# Patient Record
Sex: FEMALE | Race: WHITE | NOT HISPANIC OR LATINO | Employment: UNEMPLOYED | URBAN - METROPOLITAN AREA
[De-identification: names, ages, dates, MRNs, and addresses within clinical notes are randomized per-mention and may not be internally consistent; named-entity substitution may affect disease eponyms.]

---

## 2021-04-26 DIAGNOSIS — N13.9 OBSTRUCTIVE UROPATHY: Primary | ICD-10-CM

## 2021-04-27 ENCOUNTER — OFFICE VISIT (OUTPATIENT)
Dept: UROLOGY | Facility: CLINIC | Age: 36
End: 2021-04-27
Payer: MEDICAID

## 2021-04-27 VITALS
DIASTOLIC BLOOD PRESSURE: 96 MMHG | WEIGHT: 293 LBS | SYSTOLIC BLOOD PRESSURE: 176 MMHG | HEIGHT: 67 IN | BODY MASS INDEX: 45.99 KG/M2

## 2021-04-27 DIAGNOSIS — Q62.39 UPJ OBSTRUCTION, CONGENITAL: Primary | ICD-10-CM

## 2021-04-27 PROCEDURE — 99204 OFFICE O/P NEW MOD 45 MIN: CPT | Mod: S$GLB,,, | Performed by: UROLOGY

## 2021-04-27 PROCEDURE — 99204 PR OFFICE/OUTPT VISIT, NEW, LEVL IV, 45-59 MIN: ICD-10-PCS | Mod: S$GLB,,, | Performed by: UROLOGY

## 2021-04-27 RX ORDER — MELOXICAM 15 MG/1
TABLET ORAL
COMMUNITY
Start: 2021-04-16

## 2021-05-24 ENCOUNTER — TELEPHONE (OUTPATIENT)
Dept: UROLOGY | Facility: CLINIC | Age: 36
End: 2021-05-24

## 2021-08-03 ENCOUNTER — OUTSIDE PLACE OF SERVICE (OUTPATIENT)
Dept: UROLOGY | Facility: CLINIC | Age: 36
End: 2021-08-03
Payer: MEDICAID

## 2021-08-03 PROCEDURE — 99232 PR SUBSEQUENT HOSPITAL CARE,LEVL II: ICD-10-PCS | Mod: ,,, | Performed by: UROLOGY

## 2021-08-03 PROCEDURE — 99232 SBSQ HOSP IP/OBS MODERATE 35: CPT | Mod: ,,, | Performed by: UROLOGY

## 2021-08-04 ENCOUNTER — OUTSIDE PLACE OF SERVICE (OUTPATIENT)
Dept: UROLOGY | Facility: CLINIC | Age: 36
End: 2021-08-04
Payer: MEDICAID

## 2021-08-04 PROCEDURE — 99232 PR SUBSEQUENT HOSPITAL CARE,LEVL II: ICD-10-PCS | Mod: ,,, | Performed by: UROLOGY

## 2021-08-04 PROCEDURE — 99232 SBSQ HOSP IP/OBS MODERATE 35: CPT | Mod: ,,, | Performed by: UROLOGY

## 2021-08-06 ENCOUNTER — OUTSIDE PLACE OF SERVICE (OUTPATIENT)
Dept: UROLOGY | Facility: CLINIC | Age: 36
End: 2021-08-06
Payer: MEDICAID

## 2021-08-06 PROCEDURE — 52332 PR CYSTOSCOPY,INSERT URETERAL STENT: ICD-10-PCS | Mod: LT,,, | Performed by: UROLOGY

## 2021-08-06 PROCEDURE — 52332 CYSTOSCOPY AND TREATMENT: CPT | Mod: LT,,, | Performed by: UROLOGY

## 2021-08-06 PROCEDURE — 74420 UROGRAPHY RTRGR +-KUB: CPT | Mod: 26,,, | Performed by: UROLOGY

## 2021-08-06 PROCEDURE — 74420 PR  X-RAY RETROGRADE PYELOGRAM: ICD-10-PCS | Mod: 26,,, | Performed by: UROLOGY

## 2021-09-20 ENCOUNTER — TELEPHONE (OUTPATIENT)
Dept: UROLOGY | Facility: CLINIC | Age: 36
End: 2021-09-20

## 2021-09-21 ENCOUNTER — TELEPHONE (OUTPATIENT)
Dept: UROLOGY | Facility: CLINIC | Age: 36
End: 2021-09-21

## 2021-09-22 DIAGNOSIS — Q62.39 UPJ OBSTRUCTION, CONGENITAL: Primary | ICD-10-CM

## 2021-10-04 ENCOUNTER — TELEPHONE (OUTPATIENT)
Dept: UROLOGY | Facility: CLINIC | Age: 36
End: 2021-10-04

## 2021-10-05 ENCOUNTER — TELEPHONE (OUTPATIENT)
Dept: UROLOGY | Facility: CLINIC | Age: 36
End: 2021-10-05

## 2021-12-08 ENCOUNTER — OUTSIDE PLACE OF SERVICE (OUTPATIENT)
Dept: UROLOGY | Facility: CLINIC | Age: 36
End: 2021-12-08
Payer: MEDICAID

## 2021-12-08 LAB
CALCIUM BLD-MCNC: POSITIVE MG/DL
COVID-19 AB, IGM: NEGATIVE

## 2021-12-08 PROCEDURE — 99223 PR INITIAL HOSPITAL CARE,LEVL III: ICD-10-PCS | Mod: ,,, | Performed by: UROLOGY

## 2021-12-08 PROCEDURE — 99223 1ST HOSP IP/OBS HIGH 75: CPT | Mod: ,,, | Performed by: UROLOGY

## 2021-12-10 ENCOUNTER — OUTSIDE PLACE OF SERVICE (OUTPATIENT)
Dept: UROLOGY | Facility: CLINIC | Age: 36
End: 2021-12-10
Payer: MEDICAID

## 2021-12-10 PROCEDURE — 74420 PR  X-RAY RETROGRADE PYELOGRAM: ICD-10-PCS | Mod: 26,,, | Performed by: UROLOGY

## 2021-12-10 PROCEDURE — 52332 PR CYSTOSCOPY,INSERT URETERAL STENT: ICD-10-PCS | Mod: LT,,, | Performed by: UROLOGY

## 2021-12-10 PROCEDURE — 52332 CYSTOSCOPY AND TREATMENT: CPT | Mod: LT,,, | Performed by: UROLOGY

## 2021-12-10 PROCEDURE — 74420 UROGRAPHY RTRGR +-KUB: CPT | Mod: 26,,, | Performed by: UROLOGY

## 2021-12-11 LAB
ANION GAP SERPL CALC-SCNC: 6 MMOL/L (ref 3–11)
BASOPHILS NFR BLD: 0.3 % (ref 0–3)
BUN SERPL-MCNC: 12 MG/DL (ref 7–18)
BUN/CREAT SERPL: 8.63 RATIO (ref 7–18)
CALCIUM SERPL-MCNC: 8.2 MG/DL (ref 8.8–10.5)
CHLORIDE SERPL-SCNC: 104 MMOL/L (ref 100–108)
CO2 SERPL-SCNC: 29 MMOL/L (ref 21–32)
CREAT SERPL-MCNC: 1.39 MG/DL (ref 0.55–1.02)
EOSINOPHIL NFR BLD: 0.7 % (ref 1–3)
ERYTHROCYTE [DISTWIDTH] IN BLOOD BY AUTOMATED COUNT: 13.7 % (ref 12.5–18)
GFR ESTIMATION: 43
GLUCOSE SERPL-MCNC: 216 MG/DL (ref 70–110)
HCT VFR BLD AUTO: 30 % (ref 37–47)
HGB BLD-MCNC: 9.6 G/DL (ref 12–16)
LYMPHOCYTES NFR BLD: 16 % (ref 25–40)
MCH RBC QN AUTO: 27.7 PG (ref 27–31.2)
MCHC RBC AUTO-ENTMCNC: 32 G/DL (ref 31.8–35.4)
MCV RBC AUTO: 86.5 FL (ref 80–97)
MONOCYTES NFR BLD: 6 % (ref 1–15)
NEUTROPHILS # BLD AUTO: 4.49 10*3/UL (ref 1.8–7.7)
NEUTROPHILS NFR BLD: 76.5 % (ref 37–80)
NUCLEATED RED BLOOD CELLS: 0 %
PLATELETS: 173 10*3/UL (ref 142–424)
POTASSIUM SERPL-SCNC: 4.1 MMOL/L (ref 3.6–5.2)
RBC # BLD AUTO: 3.47 10*6/UL (ref 4.2–5.4)
SODIUM BLD-SCNC: 139 MMOL/L (ref 135–145)
WBC # BLD: 5.9 10*3/UL (ref 4.6–10.2)

## 2021-12-12 LAB
ANION GAP SERPL CALC-SCNC: 11 MMOL/L (ref 3–11)
BASOPHILS NFR BLD: 0.5 % (ref 0–3)
BUN SERPL-MCNC: 19 MG/DL (ref 7–18)
BUN/CREAT SERPL: 20.43 RATIO (ref 7–18)
CALCIUM SERPL-MCNC: 8.2 MG/DL (ref 8.8–10.5)
CHLORIDE SERPL-SCNC: 103 MMOL/L (ref 100–108)
CO2 SERPL-SCNC: 24 MMOL/L (ref 21–32)
CREAT SERPL-MCNC: 0.93 MG/DL (ref 0.55–1.02)
EOSINOPHIL NFR BLD: 3.6 % (ref 1–3)
ERYTHROCYTE [DISTWIDTH] IN BLOOD BY AUTOMATED COUNT: 13.6 % (ref 12.5–18)
GFR ESTIMATION: > 60
GLUCOSE SERPL-MCNC: 105 MG/DL (ref 70–110)
HCT VFR BLD AUTO: 31.4 % (ref 37–47)
HGB BLD-MCNC: 10.2 G/DL (ref 12–16)
LYMPHOCYTES NFR BLD: 24.5 % (ref 25–40)
MCH RBC QN AUTO: 27.9 PG (ref 27–31.2)
MCHC RBC AUTO-ENTMCNC: 32.5 G/DL (ref 31.8–35.4)
MCV RBC AUTO: 86 FL (ref 80–97)
MONOCYTES NFR BLD: 5.9 % (ref 1–15)
NEUTROPHILS # BLD AUTO: 4.83 10*3/UL (ref 1.8–7.7)
NEUTROPHILS NFR BLD: 64.4 % (ref 37–80)
NUCLEATED RED BLOOD CELLS: 0 %
PLATELETS: 198 10*3/UL (ref 142–424)
POTASSIUM SERPL-SCNC: 4.2 MMOL/L (ref 3.6–5.2)
RBC # BLD AUTO: 3.65 10*6/UL (ref 4.2–5.4)
SODIUM BLD-SCNC: 138 MMOL/L (ref 135–145)
WBC # BLD: 7.5 10*3/UL (ref 4.6–10.2)

## 2022-01-06 ENCOUNTER — TELEPHONE (OUTPATIENT)
Dept: UROLOGY | Facility: CLINIC | Age: 37
End: 2022-01-06

## 2022-01-06 NOTE — TELEPHONE ENCOUNTER
----- Message from Rosi Carmichael sent at 1/6/2022  9:34 AM CST -----  Type:  Same Day Appointment Request    Caller is requesting a same day appointment.  Caller declined first available appointment listed below.    Name of Caller:Pema Norris  When is the first available appointment?02/21  Symptoms: Pt will not be able to make it to her 9:40 appointment and would like to speak with the nurse about coming in at a later time today. She said she really needs to get her stent changed and can not wait until the next available appointment.   Best Call Back Number:[phone] (home)   Additional Information: na

## 2022-06-30 ENCOUNTER — HOSPITAL ENCOUNTER (INPATIENT)
Facility: OTHER | Age: 37
LOS: 3 days | Discharge: HOME | DRG: 699 | End: 2022-07-04
Attending: EMERGENCY MEDICINE | Admitting: HOSPITALIST

## 2022-06-30 DIAGNOSIS — N13.30 HYDRONEPHROSIS: ICD-10-CM

## 2022-06-30 DIAGNOSIS — T83.84XA PAIN DUE TO URETERAL STENT, INITIAL ENCOUNTER: ICD-10-CM

## 2022-06-30 DIAGNOSIS — Q62.11 HYDRONEPHROSIS WITH URETEROPELVIC JUNCTION (UPJ) OBSTRUCTION: ICD-10-CM

## 2022-06-30 DIAGNOSIS — N10 ACUTE PYELONEPHRITIS: ICD-10-CM

## 2022-06-30 DIAGNOSIS — R10.9 ACUTE LEFT FLANK PAIN: ICD-10-CM

## 2022-06-30 DIAGNOSIS — Q62.39 UPJ OBSTRUCTION, CONGENITAL: Primary | ICD-10-CM

## 2022-06-30 DIAGNOSIS — E66.01 MORBID OBESITY WITH BMI OF 45.0-49.9, ADULT: ICD-10-CM

## 2022-06-30 DIAGNOSIS — N20.1 CALCULUS OF URETER: ICD-10-CM

## 2022-06-30 LAB
ALBUMIN SERPL BCP-MCNC: 3 G/DL (ref 3.5–5.2)
ALP SERPL-CCNC: 106 U/L (ref 55–135)
ALT SERPL W/O P-5'-P-CCNC: 25 U/L (ref 10–44)
ANION GAP SERPL CALC-SCNC: 11 MMOL/L (ref 8–16)
AST SERPL-CCNC: 20 U/L (ref 10–40)
B-HCG UR QL: NEGATIVE
BACTERIA #/AREA URNS HPF: ABNORMAL /HPF
BASOPHILS # BLD AUTO: 0.04 K/UL (ref 0–0.2)
BASOPHILS NFR BLD: 0.6 % (ref 0–1.9)
BILIRUB SERPL-MCNC: 0.9 MG/DL (ref 0.1–1)
BILIRUB UR QL STRIP: NEGATIVE
BUN SERPL-MCNC: 14 MG/DL (ref 6–20)
CALCIUM SERPL-MCNC: 8.8 MG/DL (ref 8.7–10.5)
CHLORIDE SERPL-SCNC: 101 MMOL/L (ref 95–110)
CLARITY UR: CLEAR
CO2 SERPL-SCNC: 25 MMOL/L (ref 23–29)
COLOR UR: YELLOW
CREAT SERPL-MCNC: 0.8 MG/DL (ref 0.5–1.4)
CTP QC/QA: YES
CTP QC/QA: YES
DIFFERENTIAL METHOD: ABNORMAL
EOSINOPHIL # BLD AUTO: 0.2 K/UL (ref 0–0.5)
EOSINOPHIL NFR BLD: 2.7 % (ref 0–8)
ERYTHROCYTE [DISTWIDTH] IN BLOOD BY AUTOMATED COUNT: 14.6 % (ref 11.5–14.5)
EST. GFR  (AFRICAN AMERICAN): >60 ML/MIN/1.73 M^2
EST. GFR  (NON AFRICAN AMERICAN): >60 ML/MIN/1.73 M^2
GLUCOSE SERPL-MCNC: 98 MG/DL (ref 70–110)
GLUCOSE UR QL STRIP: NEGATIVE
HCT VFR BLD AUTO: 31.6 % (ref 37–48.5)
HCV AB SERPL QL IA: NEGATIVE
HGB BLD-MCNC: 10.4 G/DL (ref 12–16)
HGB UR QL STRIP: ABNORMAL
HIV 1+2 AB+HIV1 P24 AG SERPL QL IA: NEGATIVE
IMM GRANULOCYTES # BLD AUTO: 0.04 K/UL (ref 0–0.04)
IMM GRANULOCYTES NFR BLD AUTO: 0.6 % (ref 0–0.5)
KETONES UR QL STRIP: NEGATIVE
LEUKOCYTE ESTERASE UR QL STRIP: ABNORMAL
LYMPHOCYTES # BLD AUTO: 1.7 K/UL (ref 1–4.8)
LYMPHOCYTES NFR BLD: 24.9 % (ref 18–48)
MCH RBC QN AUTO: 27.6 PG (ref 27–31)
MCHC RBC AUTO-ENTMCNC: 32.9 G/DL (ref 32–36)
MCV RBC AUTO: 84 FL (ref 82–98)
MICROSCOPIC COMMENT: ABNORMAL
MONOCYTES # BLD AUTO: 0.5 K/UL (ref 0.3–1)
MONOCYTES NFR BLD: 6.8 % (ref 4–15)
NEUTROPHILS # BLD AUTO: 4.3 K/UL (ref 1.8–7.7)
NEUTROPHILS NFR BLD: 64.4 % (ref 38–73)
NITRITE UR QL STRIP: POSITIVE
NRBC BLD-RTO: 0 /100 WBC
PH UR STRIP: 6 [PH] (ref 5–8)
PLATELET # BLD AUTO: 166 K/UL (ref 150–450)
PMV BLD AUTO: 9.6 FL (ref 9.2–12.9)
POTASSIUM SERPL-SCNC: 3.6 MMOL/L (ref 3.5–5.1)
PROT SERPL-MCNC: 6.8 G/DL (ref 6–8.4)
PROT UR QL STRIP: ABNORMAL
RBC # BLD AUTO: 3.77 M/UL (ref 4–5.4)
RBC #/AREA URNS HPF: 75 /HPF (ref 0–4)
SARS-COV-2 RDRP RESP QL NAA+PROBE: NEGATIVE
SODIUM SERPL-SCNC: 137 MMOL/L (ref 136–145)
SP GR UR STRIP: 1.01 (ref 1–1.03)
URN SPEC COLLECT METH UR: ABNORMAL
UROBILINOGEN UR STRIP-ACNC: NEGATIVE EU/DL
WBC # BLD AUTO: 6.74 K/UL (ref 3.9–12.7)
WBC #/AREA URNS HPF: 50 /HPF (ref 0–5)
WBC CLUMPS URNS QL MICRO: ABNORMAL

## 2022-06-30 PROCEDURE — 96361 HYDRATE IV INFUSION ADD-ON: CPT

## 2022-06-30 PROCEDURE — 87086 URINE CULTURE/COLONY COUNT: CPT | Performed by: EMERGENCY MEDICINE

## 2022-06-30 PROCEDURE — U0002 COVID-19 LAB TEST NON-CDC: HCPCS | Performed by: EMERGENCY MEDICINE

## 2022-06-30 PROCEDURE — 99220 PR INITIAL OBSERVATION CARE,LEVL III: CPT | Mod: ,,, | Performed by: NURSE PRACTITIONER

## 2022-06-30 PROCEDURE — 80053 COMPREHEN METABOLIC PANEL: CPT | Performed by: EMERGENCY MEDICINE

## 2022-06-30 PROCEDURE — 99285 EMERGENCY DEPT VISIT HI MDM: CPT | Mod: 25

## 2022-06-30 PROCEDURE — 99220 PR INITIAL OBSERVATION CARE,LEVL III: ICD-10-PCS | Mod: ,,, | Performed by: NURSE PRACTITIONER

## 2022-06-30 PROCEDURE — 86803 HEPATITIS C AB TEST: CPT | Performed by: EMERGENCY MEDICINE

## 2022-06-30 PROCEDURE — 96365 THER/PROPH/DIAG IV INF INIT: CPT

## 2022-06-30 PROCEDURE — G0378 HOSPITAL OBSERVATION PER HR: HCPCS

## 2022-06-30 PROCEDURE — 25000003 PHARM REV CODE 250: Performed by: NURSE PRACTITIONER

## 2022-06-30 PROCEDURE — 81000 URINALYSIS NONAUTO W/SCOPE: CPT | Performed by: EMERGENCY MEDICINE

## 2022-06-30 PROCEDURE — 85025 COMPLETE CBC W/AUTO DIFF WBC: CPT | Performed by: EMERGENCY MEDICINE

## 2022-06-30 PROCEDURE — U0003 INFECTIOUS AGENT DETECTION BY NUCLEIC ACID (DNA OR RNA); SEVERE ACUTE RESPIRATORY SYNDROME CORONAVIRUS 2 (SARS-COV-2) (CORONAVIRUS DISEASE [COVID-19]), AMPLIFIED PROBE TECHNIQUE, MAKING USE OF HIGH THROUGHPUT TECHNOLOGIES AS DESCRIBED BY CMS-2020-01-R: HCPCS | Performed by: EMERGENCY MEDICINE

## 2022-06-30 PROCEDURE — U0005 INFEC AGEN DETEC AMPLI PROBE: HCPCS | Performed by: EMERGENCY MEDICINE

## 2022-06-30 PROCEDURE — 63600175 PHARM REV CODE 636 W HCPCS: Performed by: EMERGENCY MEDICINE

## 2022-06-30 PROCEDURE — 96375 TX/PRO/DX INJ NEW DRUG ADDON: CPT

## 2022-06-30 PROCEDURE — 87389 HIV-1 AG W/HIV-1&-2 AB AG IA: CPT | Performed by: EMERGENCY MEDICINE

## 2022-06-30 PROCEDURE — 81025 URINE PREGNANCY TEST: CPT | Performed by: EMERGENCY MEDICINE

## 2022-06-30 PROCEDURE — 63600175 PHARM REV CODE 636 W HCPCS: Performed by: NURSE PRACTITIONER

## 2022-06-30 RX ORDER — MORPHINE SULFATE 4 MG/ML
4 INJECTION, SOLUTION INTRAMUSCULAR; INTRAVENOUS EVERY 4 HOURS PRN
Status: DISCONTINUED | OUTPATIENT
Start: 2022-06-30 | End: 2022-06-30

## 2022-06-30 RX ORDER — ONDANSETRON 2 MG/ML
4 INJECTION INTRAMUSCULAR; INTRAVENOUS EVERY 8 HOURS PRN
Status: DISCONTINUED | OUTPATIENT
Start: 2022-06-30 | End: 2022-07-04 | Stop reason: HOSPADM

## 2022-06-30 RX ORDER — HYDROMORPHONE HYDROCHLORIDE 1 MG/ML
0.5 INJECTION, SOLUTION INTRAMUSCULAR; INTRAVENOUS; SUBCUTANEOUS EVERY 6 HOURS PRN
Status: DISCONTINUED | OUTPATIENT
Start: 2022-07-01 | End: 2022-07-02

## 2022-06-30 RX ORDER — NALOXONE HCL 0.4 MG/ML
0.02 VIAL (ML) INJECTION
Status: DISCONTINUED | OUTPATIENT
Start: 2022-06-30 | End: 2022-07-04 | Stop reason: HOSPADM

## 2022-06-30 RX ORDER — ONDANSETRON 2 MG/ML
4 INJECTION INTRAMUSCULAR; INTRAVENOUS
Status: COMPLETED | OUTPATIENT
Start: 2022-06-30 | End: 2022-06-30

## 2022-06-30 RX ORDER — SODIUM CHLORIDE 9 MG/ML
INJECTION, SOLUTION INTRAVENOUS CONTINUOUS
Status: DISCONTINUED | OUTPATIENT
Start: 2022-06-30 | End: 2022-07-02

## 2022-06-30 RX ORDER — ACETAMINOPHEN 325 MG/1
650 TABLET ORAL EVERY 4 HOURS PRN
Status: DISCONTINUED | OUTPATIENT
Start: 2022-06-30 | End: 2022-07-04 | Stop reason: HOSPADM

## 2022-06-30 RX ORDER — SODIUM CHLORIDE 0.9 % (FLUSH) 0.9 %
10 SYRINGE (ML) INJECTION
Status: DISCONTINUED | OUTPATIENT
Start: 2022-06-30 | End: 2022-07-04 | Stop reason: HOSPADM

## 2022-06-30 RX ORDER — SODIUM CHLORIDE 0.9 % (FLUSH) 0.9 %
10 SYRINGE (ML) INJECTION EVERY 8 HOURS PRN
Status: DISCONTINUED | OUTPATIENT
Start: 2022-06-30 | End: 2022-07-04 | Stop reason: HOSPADM

## 2022-06-30 RX ADMIN — MORPHINE SULFATE 4 MG: 4 INJECTION, SOLUTION INTRAMUSCULAR; INTRAVENOUS at 09:06

## 2022-06-30 RX ADMIN — ONDANSETRON 4 MG: 2 INJECTION INTRAMUSCULAR; INTRAVENOUS at 05:06

## 2022-06-30 RX ADMIN — SODIUM CHLORIDE: 0.9 INJECTION, SOLUTION INTRAVENOUS at 09:06

## 2022-06-30 RX ADMIN — CEFTRIAXONE 1 G: 1 INJECTION, SOLUTION INTRAVENOUS at 07:06

## 2022-06-30 NOTE — ED PROVIDER NOTES
"Dictation #1  MRN:[id number]  CSN:[id number]  Encounter Date: 6/30/2022    SCRIBE #1 NOTE: I, Melanie Keith, am scribing for, and in the presence of, Wiliam Ruiz MD.       History     Chief Complaint   Patient presents with    flank pain     Pt c.o left flank pain onset yesterday. Pt was at [facility] and was being transferred here but opted to not come by ems due to "nobody could ride with me"  pt states she was being transferred here for surgery on urethra/kidney.  AAO x 3 nadn skin w.d      Time seen by provider: 4:50 PM    This is a 36 y.o. female who presents with complaint of left flank pain onset last night. The patient reports going to [facility] yesterday for her pain, which she describes as severe and "worse than having babies".  Her  notes she had a CAT scan of her abdomen and was told she needed surgery to reconnect her ureter to her kidney. She describes being told there was no urologist available at that facility to perform the procedure. The patient was then intended to transfer here via EMS, but opted not to as her  was unable to ride with her. She reports having a fever of 101.3°F last night, which has since resolved. She describes currently feeling nauseas and light-headed. Of note, the patient has had two ureteral stents placed in her left kidney. She states her most recent stent was placed in February. The patient's urologist is Dr. Osorio. This is the extent of the patient's complaints at this time.    The history is provided by the patient and the spouse.     Review of patient's allergies indicates:  No Known Allergies  Past Medical History:   Diagnosis Date    Disorder of kidney and ureter     Urinary tract infection      No past surgical history on file.  No family history on file.  Social History     Tobacco Use    Smoking status: Never Smoker    Smokeless tobacco: Never Used   Substance Use Topics    Alcohol use: Not Currently     Review of " Systems   Constitutional: Positive for fever (resolved). Negative for chills.   HENT: Negative for rhinorrhea, sore throat and trouble swallowing.    Respiratory: Negative for chest tightness, shortness of breath and wheezing.    Cardiovascular: Negative for chest pain, palpitations and leg swelling.   Gastrointestinal: Positive for nausea. Negative for abdominal pain, diarrhea and vomiting.   Genitourinary: Positive for flank pain (left). Negative for dysuria and vaginal bleeding.   Neurological: Positive for light-headedness. Negative for speech difficulty.   All other systems reviewed and are negative.      Physical Exam     Initial Vitals [06/30/22 1551]   BP Pulse Resp Temp SpO2   (!) 156/62 95 18 98.4 °F (36.9 °C) 99 %      MAP       --         Physical Exam    Nursing note and vitals reviewed.  Constitutional: She appears well-developed and well-nourished. She is not diaphoretic. No distress.   HENT:   Head: Normocephalic and atraumatic.   Right Ear: External ear normal.   Left Ear: External ear normal.   Eyes: Conjunctivae and EOM are normal. Pupils are equal, round, and reactive to light.   Neck: Neck supple. No tracheal deviation present.   Normal range of motion.  Cardiovascular: Normal rate, regular rhythm, normal heart sounds and intact distal pulses. Exam reveals no gallop and no friction rub.    No murmur heard.  Pulmonary/Chest: Breath sounds normal. No respiratory distress. She has no wheezes. She has no rhonchi. She has no rales. She exhibits no tenderness.   Abdominal: Abdomen is soft. Bowel sounds are normal. She exhibits no distension and no mass. There is no abdominal tenderness. There is no rebound and no guarding.   Musculoskeletal:         General: Normal range of motion.      Cervical back: Normal range of motion and neck supple.     Neurological: She is alert and oriented to person, place, and time.   Skin: Skin is warm and dry. Capillary refill takes less than 2 seconds.   Psychiatric:  She has a normal mood and affect. Thought content normal.         ED Course   Procedures  Labs Reviewed   CBC W/ AUTO DIFFERENTIAL - Abnormal; Notable for the following components:       Result Value    RBC 3.77 (*)     Hemoglobin 10.4 (*)     Hematocrit 31.6 (*)     RDW 14.6 (*)     Immature Granulocytes 0.6 (*)     All other components within normal limits   COMPREHENSIVE METABOLIC PANEL - Abnormal; Notable for the following components:    Albumin 3.0 (*)     All other components within normal limits   URINALYSIS, REFLEX TO URINE CULTURE - Abnormal; Notable for the following components:    Protein, UA Trace (*)     Occult Blood UA 3+ (*)     Nitrite, UA Positive (*)     Leukocytes, UA 3+ (*)     All other components within normal limits    Narrative:     Specimen Source->Urine   URINALYSIS MICROSCOPIC - Abnormal; Notable for the following components:    RBC, UA 75 (*)     WBC, UA 50 (*)     WBC Clumps, UA Occasional (*)     Bacteria Many (*)     All other components within normal limits    Narrative:     Specimen Source->Urine   CULTURE, URINE   HIV 1 / 2 ANTIBODY    Narrative:     Release to patient->Immediate   HEPATITIS C ANTIBODY    Narrative:     Release to patient->Immediate   POCT URINE PREGNANCY   SARS-COV-2 RDRP GENE          Imaging Results    None          Medications   cefTRIAXone (ROCEPHIN) 1 g/50 mL D5W IVPB (1 g Intravenous New Bag 6/30/22 1940)   ondansetron injection 4 mg (4 mg Intravenous Given 6/30/22 1740)     Medical Decision Making:   History:   Old Medical Records: I decided to obtain old medical records.  Old Records Summarized: other records.       <> Summary of Records: Reviewed and summarized the old medical record and it showed:   Note from the [facility] :  Patient is a 36 y.o. female who has a past medical history of Disorder of kidney and ureter and Urinary tract infection presented to [facility] with fever, weakness and fatigue. Patient found to have UTI on UA and  started on IV Rocephin. WBC normal and lactic acid normal. CT scan showed severe left hydronephrosis with edematous changes around left kidney. Patient has double J ureteral stents placed in the past. CT scan showed severe left hydronephrosis with edematous changes around left kidney. Urology consulted at facility and recommended transfer as patient is too complex for their facility. Urology connected to referring provider who has agreed to consult. Patient stable for transfer.   Differential Diagnosis:   Urinary tract infection, acute pyelonephritis, ureterolithiais, diverticulitis, colitis, inflammatory bowel disease, gastroenteritis, gastritis, ulcer, cholecystitis, gallstones, pancreatitis, ileus, small bowel obstruction, appendicitis, constipation      Clinical Tests:   Lab Tests: Ordered and Reviewed  ED Management:  35yo female with concerning findings of acute pyelonephritis, severe left hydronephrosis with indwelling stent, will admit to  and consult Urology. Patient was COVID positive last pm, but has no complaints of chest pain or SOB, her lung exam was normal and her sats are 99%, so will place on airbag precautions and evaluate.            Scribe Attestation:   Scribe #1: I performed the above scribed service and the documentation accurately describes the services I performed. I attest to the accuracy of the note.        ED Course as of 06/30/22 1955 Thu Jun 30, 2022 1904 Patient apparently tested positive for COVID last PM, will set proper precautions [MA]   1932 Case discussed with Dr. Rob, urology, recommends with continue with antibiotics and they will evaluate her in the morning. [MA]   1954 Case discussed with Hospital Medicine, will admit to Dr. Correa.  [MA]      ED Course User Index  [MA] Wiliam Ruiz MD           Physician Attestation for Scribe: I, MAA, reviewed documentation as scribed in my presence, which is both accurate and complete.  Clinical Impression:   Final  diagnoses:  [N10] Acute pyelonephritis          ED Disposition Condition    Observation               Wiliam Ruiz MD  06/30/22 1956

## 2022-06-30 NOTE — ED TRIAGE NOTES
Pt presents to ED c/o L flank pain onset yesterday. Was seen at [facility] and bakari with hydronephrosis. Says that she was sent here for surgery on urethra/kidney, but opted to drive herself because EMS said her  could not come with her.

## 2022-07-01 ENCOUNTER — ANESTHESIA EVENT (OUTPATIENT)
Dept: SURGERY | Facility: OTHER | Age: 37
DRG: 699 | End: 2022-07-01

## 2022-07-01 ENCOUNTER — ANESTHESIA (OUTPATIENT)
Dept: SURGERY | Facility: OTHER | Age: 37
DRG: 699 | End: 2022-07-01

## 2022-07-01 ENCOUNTER — TELEPHONE (OUTPATIENT)
Dept: UROLOGY | Facility: CLINIC | Age: 37
End: 2022-07-01

## 2022-07-01 PROBLEM — E66.01 MORBID OBESITY WITH BMI OF 45.0-49.9, ADULT: Status: ACTIVE | Noted: 2022-07-01

## 2022-07-01 LAB
ALBUMIN SERPL BCP-MCNC: 2.8 G/DL (ref 3.5–5.2)
ALP SERPL-CCNC: 101 U/L (ref 55–135)
ALT SERPL W/O P-5'-P-CCNC: 23 U/L (ref 10–44)
ANION GAP SERPL CALC-SCNC: 10 MMOL/L (ref 8–16)
AST SERPL-CCNC: 18 U/L (ref 10–40)
BASOPHILS # BLD AUTO: 0.03 K/UL (ref 0–0.2)
BASOPHILS NFR BLD: 0.6 % (ref 0–1.9)
BILIRUB SERPL-MCNC: 0.5 MG/DL (ref 0.1–1)
BUN SERPL-MCNC: 13 MG/DL (ref 6–20)
CALCIUM SERPL-MCNC: 8.7 MG/DL (ref 8.7–10.5)
CHLORIDE SERPL-SCNC: 105 MMOL/L (ref 95–110)
CO2 SERPL-SCNC: 26 MMOL/L (ref 23–29)
CREAT SERPL-MCNC: 0.8 MG/DL (ref 0.5–1.4)
DIFFERENTIAL METHOD: ABNORMAL
EOSINOPHIL # BLD AUTO: 0.2 K/UL (ref 0–0.5)
EOSINOPHIL NFR BLD: 2.9 % (ref 0–8)
ERYTHROCYTE [DISTWIDTH] IN BLOOD BY AUTOMATED COUNT: 14.7 % (ref 11.5–14.5)
EST. GFR  (AFRICAN AMERICAN): >60 ML/MIN/1.73 M^2
EST. GFR  (NON AFRICAN AMERICAN): >60 ML/MIN/1.73 M^2
GLUCOSE SERPL-MCNC: 117 MG/DL (ref 70–110)
HCT VFR BLD AUTO: 32.2 % (ref 37–48.5)
HGB BLD-MCNC: 10.4 G/DL (ref 12–16)
IMM GRANULOCYTES # BLD AUTO: 0.05 K/UL (ref 0–0.04)
IMM GRANULOCYTES NFR BLD AUTO: 1 % (ref 0–0.5)
LACTATE SERPL-SCNC: 0.8 MMOL/L (ref 0.5–2.2)
LYMPHOCYTES # BLD AUTO: 1.7 K/UL (ref 1–4.8)
LYMPHOCYTES NFR BLD: 32.3 % (ref 18–48)
MAGNESIUM SERPL-MCNC: 2 MG/DL (ref 1.6–2.6)
MCH RBC QN AUTO: 27.1 PG (ref 27–31)
MCHC RBC AUTO-ENTMCNC: 32.3 G/DL (ref 32–36)
MCV RBC AUTO: 84 FL (ref 82–98)
MONOCYTES # BLD AUTO: 0.3 K/UL (ref 0.3–1)
MONOCYTES NFR BLD: 6.5 % (ref 4–15)
NEUTROPHILS # BLD AUTO: 3 K/UL (ref 1.8–7.7)
NEUTROPHILS NFR BLD: 56.7 % (ref 38–73)
NRBC BLD-RTO: 0 /100 WBC
PHOSPHATE SERPL-MCNC: 3.1 MG/DL (ref 2.7–4.5)
PLATELET # BLD AUTO: 190 K/UL (ref 150–450)
PMV BLD AUTO: 9.9 FL (ref 9.2–12.9)
POTASSIUM SERPL-SCNC: 4 MMOL/L (ref 3.5–5.1)
PROT SERPL-MCNC: 6.6 G/DL (ref 6–8.4)
RBC # BLD AUTO: 3.84 M/UL (ref 4–5.4)
SARS-COV-2 RNA RESP QL NAA+PROBE: NOT DETECTED
SARS-COV-2- CYCLE NUMBER: NORMAL
SODIUM SERPL-SCNC: 141 MMOL/L (ref 136–145)
WBC # BLD AUTO: 5.2 K/UL (ref 3.9–12.7)

## 2022-07-01 PROCEDURE — 99233 PR SUBSEQUENT HOSPITAL CARE,LEVL III: ICD-10-PCS | Mod: ,,, | Performed by: HOSPITALIST

## 2022-07-01 PROCEDURE — 37000008 HC ANESTHESIA 1ST 15 MINUTES: Performed by: UROLOGY

## 2022-07-01 PROCEDURE — C1758 CATHETER, URETERAL: HCPCS | Performed by: UROLOGY

## 2022-07-01 PROCEDURE — 87086 URINE CULTURE/COLONY COUNT: CPT | Performed by: UROLOGY

## 2022-07-01 PROCEDURE — 71000033 HC RECOVERY, INTIAL HOUR: Performed by: UROLOGY

## 2022-07-01 PROCEDURE — 99223 1ST HOSP IP/OBS HIGH 75: CPT | Mod: 25,,, | Performed by: UROLOGY

## 2022-07-01 PROCEDURE — 37000009 HC ANESTHESIA EA ADD 15 MINS: Performed by: UROLOGY

## 2022-07-01 PROCEDURE — 25500020 PHARM REV CODE 255: Performed by: UROLOGY

## 2022-07-01 PROCEDURE — 25000003 PHARM REV CODE 250: Performed by: NURSE PRACTITIONER

## 2022-07-01 PROCEDURE — 25000003 PHARM REV CODE 250: Performed by: ANESTHESIOLOGY

## 2022-07-01 PROCEDURE — 63600175 PHARM REV CODE 636 W HCPCS: Performed by: NURSE PRACTITIONER

## 2022-07-01 PROCEDURE — 71000039 HC RECOVERY, EACH ADD'L HOUR: Performed by: UROLOGY

## 2022-07-01 PROCEDURE — 52317 REMOVE BLADDER STONE: CPT | Mod: ,,, | Performed by: UROLOGY

## 2022-07-01 PROCEDURE — 99233 SBSQ HOSP IP/OBS HIGH 50: CPT | Mod: ,,, | Performed by: HOSPITALIST

## 2022-07-01 PROCEDURE — 99223 PR INITIAL HOSPITAL CARE,LEVL III: ICD-10-PCS | Mod: 25,,, | Performed by: UROLOGY

## 2022-07-01 PROCEDURE — 25000003 PHARM REV CODE 250: Performed by: NURSE ANESTHETIST, CERTIFIED REGISTERED

## 2022-07-01 PROCEDURE — 27000221 HC OXYGEN, UP TO 24 HOURS

## 2022-07-01 PROCEDURE — 82365 CALCULUS SPECTROSCOPY: CPT | Performed by: UROLOGY

## 2022-07-01 PROCEDURE — 11000001 HC ACUTE MED/SURG PRIVATE ROOM

## 2022-07-01 PROCEDURE — 25000003 PHARM REV CODE 250

## 2022-07-01 PROCEDURE — 94761 N-INVAS EAR/PLS OXIMETRY MLT: CPT

## 2022-07-01 PROCEDURE — 83735 ASSAY OF MAGNESIUM: CPT | Performed by: NURSE PRACTITIONER

## 2022-07-01 PROCEDURE — 80053 COMPREHEN METABOLIC PANEL: CPT | Performed by: NURSE PRACTITIONER

## 2022-07-01 PROCEDURE — 63600175 PHARM REV CODE 636 W HCPCS: Performed by: NURSE ANESTHETIST, CERTIFIED REGISTERED

## 2022-07-01 PROCEDURE — 83605 ASSAY OF LACTIC ACID: CPT | Performed by: NURSE PRACTITIONER

## 2022-07-01 PROCEDURE — 36000706: Performed by: UROLOGY

## 2022-07-01 PROCEDURE — 36000707: Performed by: UROLOGY

## 2022-07-01 PROCEDURE — 96361 HYDRATE IV INFUSION ADD-ON: CPT

## 2022-07-01 PROCEDURE — 52317 PR REMOVE BLADDER STONE,<2.5 CM: ICD-10-PCS | Mod: ,,, | Performed by: UROLOGY

## 2022-07-01 PROCEDURE — 85025 COMPLETE CBC W/AUTO DIFF WBC: CPT | Performed by: NURSE PRACTITIONER

## 2022-07-01 PROCEDURE — 84100 ASSAY OF PHOSPHORUS: CPT | Performed by: NURSE PRACTITIONER

## 2022-07-01 PROCEDURE — 96375 TX/PRO/DX INJ NEW DRUG ADDON: CPT

## 2022-07-01 RX ORDER — PROPOFOL 10 MG/ML
VIAL (ML) INTRAVENOUS
Status: DISCONTINUED | OUTPATIENT
Start: 2022-07-01 | End: 2022-07-01

## 2022-07-01 RX ORDER — LIDOCAINE HCL/PF 100 MG/5ML
SYRINGE (ML) INTRAVENOUS
Status: DISCONTINUED | OUTPATIENT
Start: 2022-07-01 | End: 2022-07-01

## 2022-07-01 RX ORDER — FUROSEMIDE 10 MG/ML
INJECTION INTRAMUSCULAR; INTRAVENOUS
Status: DISCONTINUED | OUTPATIENT
Start: 2022-07-01 | End: 2022-07-01

## 2022-07-01 RX ORDER — KETOROLAC TROMETHAMINE 30 MG/ML
INJECTION, SOLUTION INTRAMUSCULAR; INTRAVENOUS
Status: DISCONTINUED | OUTPATIENT
Start: 2022-07-01 | End: 2022-07-01

## 2022-07-01 RX ORDER — PROCHLORPERAZINE EDISYLATE 5 MG/ML
5 INJECTION INTRAMUSCULAR; INTRAVENOUS EVERY 30 MIN PRN
Status: DISCONTINUED | OUTPATIENT
Start: 2022-07-01 | End: 2022-07-04 | Stop reason: HOSPADM

## 2022-07-01 RX ORDER — PROCHLORPERAZINE EDISYLATE 5 MG/ML
INJECTION INTRAMUSCULAR; INTRAVENOUS
Status: DISCONTINUED | OUTPATIENT
Start: 2022-07-01 | End: 2022-07-01

## 2022-07-01 RX ORDER — HYDROMORPHONE HYDROCHLORIDE 2 MG/ML
0.4 INJECTION, SOLUTION INTRAMUSCULAR; INTRAVENOUS; SUBCUTANEOUS EVERY 5 MIN PRN
Status: DISCONTINUED | OUTPATIENT
Start: 2022-07-01 | End: 2022-07-01

## 2022-07-01 RX ORDER — OXYCODONE HYDROCHLORIDE 5 MG/1
5 TABLET ORAL
Status: DISCONTINUED | OUTPATIENT
Start: 2022-07-01 | End: 2022-07-04 | Stop reason: HOSPADM

## 2022-07-01 RX ORDER — FENTANYL CITRATE 50 UG/ML
INJECTION, SOLUTION INTRAMUSCULAR; INTRAVENOUS
Status: DISCONTINUED | OUTPATIENT
Start: 2022-07-01 | End: 2022-07-01

## 2022-07-01 RX ORDER — SODIUM CHLORIDE 0.9 % (FLUSH) 0.9 %
3 SYRINGE (ML) INJECTION
Status: DISCONTINUED | OUTPATIENT
Start: 2022-07-01 | End: 2022-07-04 | Stop reason: HOSPADM

## 2022-07-01 RX ORDER — MEPERIDINE HYDROCHLORIDE 25 MG/ML
12.5 INJECTION INTRAMUSCULAR; INTRAVENOUS; SUBCUTANEOUS ONCE AS NEEDED
Status: DISCONTINUED | OUTPATIENT
Start: 2022-07-01 | End: 2022-07-01

## 2022-07-01 RX ORDER — MIDAZOLAM HYDROCHLORIDE 1 MG/ML
INJECTION INTRAMUSCULAR; INTRAVENOUS
Status: DISCONTINUED | OUTPATIENT
Start: 2022-07-01 | End: 2022-07-01

## 2022-07-01 RX ORDER — DIPHENHYDRAMINE HCL 25 MG
25 CAPSULE ORAL ONCE
Status: COMPLETED | OUTPATIENT
Start: 2022-07-01 | End: 2022-07-01

## 2022-07-01 RX ORDER — DEXAMETHASONE SODIUM PHOSPHATE 4 MG/ML
INJECTION, SOLUTION INTRA-ARTICULAR; INTRALESIONAL; INTRAMUSCULAR; INTRAVENOUS; SOFT TISSUE
Status: DISCONTINUED | OUTPATIENT
Start: 2022-07-01 | End: 2022-07-01

## 2022-07-01 RX ORDER — ONDANSETRON 2 MG/ML
INJECTION INTRAMUSCULAR; INTRAVENOUS
Status: DISCONTINUED | OUTPATIENT
Start: 2022-07-01 | End: 2022-07-01

## 2022-07-01 RX ORDER — ROCURONIUM BROMIDE 10 MG/ML
INJECTION, SOLUTION INTRAVENOUS
Status: DISCONTINUED | OUTPATIENT
Start: 2022-07-01 | End: 2022-07-01

## 2022-07-01 RX ADMIN — FUROSEMIDE 5 MG: 10 INJECTION, SOLUTION INTRAMUSCULAR; INTRAVENOUS at 12:07

## 2022-07-01 RX ADMIN — FENTANYL CITRATE 50 MCG: 50 INJECTION, SOLUTION INTRAMUSCULAR; INTRAVENOUS at 12:07

## 2022-07-01 RX ADMIN — OXYCODONE 5 MG: 5 TABLET ORAL at 11:07

## 2022-07-01 RX ADMIN — SODIUM CHLORIDE: 0.9 INJECTION, SOLUTION INTRAVENOUS at 11:07

## 2022-07-01 RX ADMIN — KETOROLAC TROMETHAMINE 30 MG: 30 INJECTION, SOLUTION INTRAMUSCULAR; INTRAVENOUS at 12:07

## 2022-07-01 RX ADMIN — CEFTRIAXONE 1 G: 1 INJECTION, SOLUTION INTRAVENOUS at 07:07

## 2022-07-01 RX ADMIN — SUGAMMADEX 300 MG: 100 INJECTION, SOLUTION INTRAVENOUS at 01:07

## 2022-07-01 RX ADMIN — PROPOFOL 170 MG: 10 INJECTION, EMULSION INTRAVENOUS at 12:07

## 2022-07-01 RX ADMIN — ONDANSETRON HYDROCHLORIDE 4 MG: 2 INJECTION INTRAMUSCULAR; INTRAVENOUS at 12:07

## 2022-07-01 RX ADMIN — ROCURONIUM BROMIDE 50 MG: 10 INJECTION, SOLUTION INTRAVENOUS at 12:07

## 2022-07-01 RX ADMIN — CEFTRIAXONE 1 G: 1 INJECTION, SOLUTION INTRAVENOUS at 12:07

## 2022-07-01 RX ADMIN — DEXAMETHASONE SODIUM PHOSPHATE 8 MG: 4 INJECTION, SOLUTION INTRAMUSCULAR; INTRAVENOUS at 12:07

## 2022-07-01 RX ADMIN — DIPHENHYDRAMINE HYDROCHLORIDE 25 MG: 25 CAPSULE ORAL at 12:07

## 2022-07-01 RX ADMIN — FENTANYL CITRATE 50 MCG: 50 INJECTION, SOLUTION INTRAMUSCULAR; INTRAVENOUS at 01:07

## 2022-07-01 RX ADMIN — SODIUM CHLORIDE, SODIUM LACTATE, POTASSIUM CHLORIDE, AND CALCIUM CHLORIDE: .6; .31; .03; .02 INJECTION, SOLUTION INTRAVENOUS at 12:07

## 2022-07-01 RX ADMIN — MIDAZOLAM HYDROCHLORIDE 2 MG: 1 INJECTION, SOLUTION INTRAMUSCULAR; INTRAVENOUS at 12:07

## 2022-07-01 RX ADMIN — LIDOCAINE HYDROCHLORIDE 100 MG: 20 INJECTION, SOLUTION INTRAVENOUS at 12:07

## 2022-07-01 RX ADMIN — HYDROMORPHONE HYDROCHLORIDE 0.5 MG: 1 INJECTION, SOLUTION INTRAMUSCULAR; INTRAVENOUS; SUBCUTANEOUS at 06:07

## 2022-07-01 RX ADMIN — PROCHLORPERAZINE EDISYLATE 5 MG: 5 INJECTION INTRAMUSCULAR; INTRAVENOUS at 01:07

## 2022-07-01 RX ADMIN — HYDROMORPHONE HYDROCHLORIDE 0.5 MG: 1 INJECTION, SOLUTION INTRAMUSCULAR; INTRAVENOUS; SUBCUTANEOUS at 07:07

## 2022-07-01 NOTE — OP NOTE
[facility]  Operative Note    Date: 07/01/2022    Pre-Op Diagnosis: Left UPJ obstruction    Patient Active Problem List    Diagnosis Date Noted    Acute pyelonephritis 06/30/2022    UPJ obstruction, congenital 06/30/2022       Post-Op Diagnosis: same    Procedure(s) Performed:   1.  Cystoscopy with left retrograde pyelogram  2.  Fluoroscopy < 1 hour  3.  Intra-operative interpretation of radiographic images  4.  Cystolitholapaxy  5.  Left ureteral JJ stent removal     Specimen(s): urine for culture, bladder/stent stone for analysis    Staff Surgeon: Bhanu Welch MD    Assistant Surgeon: Trace Maradiaga MD    Anesthesia: General LMA anesthesia    Indications: Pema Norris is a 36 y.o. female with possible left UPJ obstruction presents for cystoscopy with left ureteral stent removal and left RPG.     Findings: Normal cysto. Left UO with inflammatory changes due to chronic irritation from ureteral stent. Left ureteral stent visualized eminating from the left UO with encrustations along the entire outer coil. The encrustations were removed with Alligator graspers which were subsequently used to remove the stent in its entirety. Left RPG showing a dilated renal pelvis with a moderately dilated upper renal calyx. Middle and lower calyces were not dilated. There was appropriate funneling of the renal pelvis into the ureter which was in normal anatomic position. There was delayed contrast efflux from the renal pelvis and ureter. There were no obvious signs of obstruction or narrowing along the entire course of the left ureter.     Estimated Blood Loss: min    Drains: none    Procedure in Detail:  After risks, benefits and possible complications of cystoscopy were explained, the patient elected to undergo the procedure and informed consent was obtained. All questions were answered in the filomena-operative area. The patient was transferred to the cystoscopy suite and placed in the supine position.  SCDs  were applied and working.  MAC anesthesia was administered.  Once adequately sedated, the patient was placed in the dorsal lithotomy position and prepped and draped in the usual sterile fashion.  Time out was performed, filomena-procedural antibiotics were confirmed.     A rigid cystoscope in a 22 Fr sheath was introduced into the bladder per urethra. This passed easily. The entire urethra was visualized which showed no masses or strictures.  The right and left ureteral orifices were identified in the normal anatomic position.  Formal cystoscopy was performed which revealed no masses or lesions suspicious for malignancy, no trabeculations, no bladder stones and no bladder diverticula. The left UO was erythematous with inflammatory changes due to hx of chronic ureteral stent in place. A ureteral stent was seen eminating from the left UO with stones wrapped around the entire coil within the bladder.     The left UO was identified and cannulated with a motion wire that was inserted along side the ureteral stent. Alligator graspers were then inserted into the bladder via the cystoscope and used to fragment the stones/encurstations along the ureteral stent's external coil. The grasper were then used to grab the stent and remove it in its entirety. A 5 Fr open-ended ureteral catheter was inserted over the motion wire and placed at the level of the renal pelvis under fluoro. Using fluoroscopy, a RPG was performed which showed the above findings. The ureteral catheter and motion wire were then removed in their entirety.     The cystoscope was placed back into the bladder and used to evacuate the remaining bladder stones that were removed from the ureteral stent. There was clear efflux of urine observed from bilateral UO's.The bladder was drained, and the patient was removed from lithotomy.     The patient tolerated the procedure well and was transferred to recovery in stable condition.    Disposition:  The patient will follow  up with Dr. Welch in 1 week with prior CTu and MAG-3 scan with lasix following a period of left ureteral rest.    Trace Maradiaga MD

## 2022-07-01 NOTE — ASSESSMENT & PLAN NOTE
"Patient with chronic congenital UPJ obstruction and left ureteral double-J stent in place presented to [facility] with severe left flank pain.  She is on empiric antibiotics for pyelonephritis given UA findings, although it seems pain was due to inflammation involving the stent.  Stent was due for removal and was taken out by urology, left out for a period of "ureteral rest."  Continue ceftriaxone while awaiting cultures.  Blood cultures NGTD.  "

## 2022-07-01 NOTE — ASSESSMENT & PLAN NOTE
Patient with double J stent in place.  Urology consulted and performed cystoscopy with left retrograde pyelogram, stone removal and left ureteral JJ stent removal.  Dr. Welch noted patency of the UPJ and will leave the stent out for now.  Follow up in one week for Lasix renal scan and CT urogram.  Depending on results pyeloplasty might be needed.

## 2022-07-01 NOTE — ASSESSMENT & PLAN NOTE
U/A positive for 3+ leukocytes and many bacteria, nitrite positive.    Rocephin  IVF  Consult Urology  Urine/Blood cultures pending  Morphine

## 2022-07-01 NOTE — ANESTHESIA PREPROCEDURE EVALUATION
07/01/2022  Pema Norris is a 36 y.o., female.      Pre-op Assessment    I have reviewed the Patient Summary Reports.     I have reviewed the Nursing Notes. I have reviewed the NPO Status.   I have reviewed the Medications.     Review of Systems  Anesthesia Hx:  No problems with previous Anesthesia    Social:  Non-Smoker    EENT/Dental:EENT/Dental Normal   Cardiovascular:   Exercise tolerance: good    Pulmonary:  Pulmonary Normal    Renal/:   Chronic Renal Disease    Hepatic/GI:  Hepatic/GI Normal    Endocrine:  Endocrine Normal  Morbid Obesity / BMI > 40  Psych:  Psychiatric Normal           Physical Exam  General: Well nourished, Cooperative and Alert    Airway:  Mallampati: II   Mouth Opening: Normal  TM Distance: Normal  Tongue: Normal  Neck ROM: Normal ROM    Dental:  Intact        Anesthesia Plan  Type of Anesthesia, risks & benefits discussed:    Anesthesia Type: Gen ETT  Intra-op Monitoring Plan: Standard ASA Monitors  Post Op Pain Control Plan: multimodal analgesia  Induction:  IV  Airway Plan: Video  Informed Consent: Informed consent signed with the Patient and all parties understand the risks and agree with anesthesia plan.  All questions answered.   ASA Score: 3 Emergent    Ready For Surgery From Anesthesia Perspective.     .

## 2022-07-01 NOTE — ASSESSMENT & PLAN NOTE
Per Dr. Osorio's procedure note.  Presents today with severe left sided hydronephrosis    Consult Urology

## 2022-07-01 NOTE — SUBJECTIVE & OBJECTIVE
Past Medical History:   Diagnosis Date    Disorder of kidney and ureter     Urinary tract infection        No past surgical history on file.    Review of patient's allergies indicates:  No Known Allergies    No current facility-administered medications on file prior to encounter.     Current Outpatient Medications on File Prior to Encounter   Medication Sig    meloxicam (MOBIC) 15 MG tablet      Family History    None       Tobacco Use    Smoking status: Never Smoker    Smokeless tobacco: Never Used   Substance and Sexual Activity    Alcohol use: Not Currently    Drug use: Not on file    Sexual activity: Not on file     Review of Systems   Constitutional:  Negative for activity change, appetite change and fever.   HENT:  Negative for congestion, ear pain, rhinorrhea and sinus pressure.    Eyes:  Negative for pain and discharge.   Respiratory:  Negative for cough, chest tightness, shortness of breath and wheezing.    Cardiovascular:  Negative for chest pain and leg swelling.   Gastrointestinal:  Negative for abdominal distention, abdominal pain, diarrhea, nausea and vomiting.   Endocrine: Negative for cold intolerance and heat intolerance.   Genitourinary:  Positive for flank pain (left). Negative for difficulty urinating, frequency, hematuria and urgency.   Musculoskeletal:  Negative for arthralgias, joint swelling and myalgias.   Allergic/Immunologic: Negative for environmental allergies and food allergies.   Neurological:  Negative for dizziness, weakness, light-headedness and headaches.   Hematological:  Does not bruise/bleed easily.   Psychiatric/Behavioral:  Negative for agitation, behavioral problems and decreased concentration.    Objective:     Vital Signs (Most Recent):  Temp: 98.4 °F (36.9 °C) (06/30/22 1551)  Pulse: 95 (06/30/22 1551)  Resp: 18 (06/30/22 1551)  BP: (!) 156/62 (06/30/22 1551)  SpO2: 99 % (06/30/22 1551)   Vital Signs (24h Range):  Temp:  [98.4 °F (36.9 °C)] 98.4 °F (36.9 °C)  Pulse:   [91-95] 95  Resp:  [18-20] 18  SpO2:  [96 %-99 %] 99 %  BP: (122-156)/(62-68) 156/62     Weight: 127.9 kg (282 lb)  Body mass index is 42.88 kg/m².    Physical Exam  Constitutional:       Appearance: Normal appearance. She is well-developed.   HENT:      Head: Normocephalic.   Eyes:      General:         Right eye: No discharge.         Left eye: No discharge.      Conjunctiva/sclera: Conjunctivae normal.   Cardiovascular:      Rate and Rhythm: Regular rhythm. Tachycardia present.      Pulses:           Radial pulses are 2+ on the right side and 2+ on the left side.      Heart sounds: Normal heart sounds.   Pulmonary:      Effort: Pulmonary effort is normal. No respiratory distress.      Breath sounds: Normal breath sounds.   Abdominal:      General: Bowel sounds are increased. There is no distension.      Palpations: Abdomen is soft.      Tenderness: There is no abdominal tenderness.   Musculoskeletal:         General: Normal range of motion.      Cervical back: Normal range of motion and neck supple.   Skin:     General: Skin is warm and dry.   Neurological:      Mental Status: She is alert and oriented to person, place, and time.      GCS: GCS eye subscore is 4. GCS verbal subscore is 5. GCS motor subscore is 6.      Motor: Motor function is intact.   Psychiatric:         Mood and Affect: Mood normal.         Speech: Speech normal.         Behavior: Behavior normal.         Thought Content: Thought content normal.           Significant Labs: All pertinent labs within the past 24 hours have been reviewed.  CBC:   Recent Labs   Lab 06/30/22  1831   WBC 6.74   HGB 10.4*   HCT 31.6*        CMP:   Recent Labs   Lab 06/30/22  1831      K 3.6      CO2 25   GLU 98   BUN 14   CREATININE 0.8   CALCIUM 8.8   PROT 6.8   ALBUMIN 3.0*   BILITOT 0.9   ALKPHOS 106   AST 20   ALT 25   ANIONGAP 11   EGFRNONAA >60       Significant Imaging: I have reviewed all pertinent imaging results/findings within the past 24  hours.

## 2022-07-01 NOTE — CONSULTS
[facility] ([place])  Urology  Consult Note    Patient Name: Pema Norris  MRN: [id number]  Admission Date: 6/30/2022  Hospital Length of Stay: 0   Code Status: Full Code   Attending Provider: Annel Gomez MD   Consulting Provider: Trace Maradiaga MD  Primary Care Physician: Servando Blackburn MD  Principal Problem:Acute pyelonephritis    Inpatient consult to Urology  Consult performed by: Trace Maradiaga MD  Consult ordered by: Jorge Mendieta NP        Subjective:     HPI:  Pema Norris is a 36 y.o. female who presents from [facility] for severe left flank pain and fevers/chills with known history of left UPJ obstructions, left ureteral stent in place, last exchanged in February of 2022. She is an established urology patient of Dr. Osorio. He diagnosed her with left UPJ obstruction with crossing vessels back in August of 2021 with Mag3 scan showing delayed left nephrogram with functionality of 62% and 37% of the right and left kidney, respectively. A recommendation was made to have a possible left pyeloplasty, but as of now, no surgeries have been conducted or are planned. At bedside, she is AFVSS with pain well controlled. Cr 0.8 (baseline), WBC 5.2, hgb 10.4. UA with 3+OB, 3+ leuks, and nitrite positive. Micro-UA with 75RBC, 50WBC and many bacteria. Ucx pending. She remains on CTX.      Past Medical History:   Diagnosis Date    Disorder of kidney and ureter     Urinary tract infection        No past surgical history on file.    Review of patient's allergies indicates:  No Known Allergies    Family History    None         Tobacco Use    Smoking status: Never Smoker    Smokeless tobacco: Never Used   Substance and Sexual Activity    Alcohol use: Not Currently    Drug use: Not on file    Sexual activity: Not on file       Review of Systems   Constitutional:  Negative for chills and fever.   HENT:  Negative for sore throat and trouble swallowing.    Eyes:  Negative for pain and  discharge.   Respiratory:  Negative for shortness of breath and wheezing.    Cardiovascular:  Negative for chest pain and palpitations.   Gastrointestinal:  Negative for abdominal pain, diarrhea, nausea and vomiting.   Genitourinary:         As per HPI   Musculoskeletal:  Negative for back pain and joint swelling.   Skin:  Negative for rash and wound.   Neurological:  Negative for seizures, weakness and headaches.   Psychiatric/Behavioral:  Negative for hallucinations. The patient is not nervous/anxious.      Objective:     Temp:  [97.7 °F (36.5 °C)-98.8 °F (37.1 °C)] 98 °F (36.7 °C)  Pulse:  [] 92  Resp:  [16-20] 18  SpO2:  [95 %-99 %] 95 %  BP: (117-158)/(62-82) 117/67     Body mass index is 48.28 kg/m².           Drains       None                   Physical Exam  Vitals and nursing note reviewed.   Constitutional:       General: She is not in acute distress.  HENT:      Head: Atraumatic.      Nose: Nose normal.   Eyes:      Extraocular Movements: Extraocular movements intact.   Cardiovascular:      Rate and Rhythm: Normal rate.   Pulmonary:      Effort: Pulmonary effort is normal.   Abdominal:      General: Abdomen is flat.      Tenderness: There is no abdominal tenderness. There is no right CVA tenderness or left CVA tenderness.   Musculoskeletal:         General: Normal range of motion.      Cervical back: Normal range of motion.   Neurological:      General: No focal deficit present.      Mental Status: She is alert. Mental status is at baseline.   Psychiatric:         Mood and Affect: Mood normal.         Behavior: Behavior normal.         Thought Content: Thought content normal.         Judgment: Judgment normal.       Significant Labs:    BMP:  Recent Labs   Lab 06/30/22 1831 07/01/22  0454    141   K 3.6 4.0    105   CO2 25 26   BUN 14 13   CREATININE 0.8 0.8   CALCIUM 8.8 8.7       CBC:  Recent Labs   Lab 06/30/22 1831 07/01/22  0454   WBC 6.74 5.20   HGB 10.4* 10.4*   HCT 31.6* 32.2*     190       All pertinent labs results from the past 24 hours have been reviewed.    Significant Imaging:  All pertinent imaging results/findings from the past 24 hours have been reviewed.                      Assessment and Plan:     UPJ obstruction, congenital  Pema Norris is a 36 y.o. female with known left UPJ obstruction, ureteral stent in place, presenting for worsening left flank pain and possible pyelonephritis.    - Consult d/w Staff Urologist  - Strict I's/O's  - Trend Cr, avoid nephrotoxic agents, and dose medications to patient's current GFR  - NPO  - Consent obtained for cysto with left RPG and left ureteral stent exchange today  - Ucx pending, will f/u results, tailor abx as needed  - All remaining care per primary team  - Urology to follow along        VTE Risk Mitigation (From admission, onward)         Ordered     Place sequential compression device  Until discontinued         06/30/22 2110     IP VTE HIGH RISK PATIENT  Once         06/30/22 2110     Place sequential compression device  Until discontinued         06/30/22 2110     Reason for No Pharmacological VTE Prophylaxis  Once        Question:  Reasons:  Answer:  Risk of Bleeding    06/30/22 2110                Thank you for your consult. I will follow-up with patient. Please contact us if you have any additional questions.    Trace Maradiaga MD  Urology  [facility] ([place])

## 2022-07-01 NOTE — TELEPHONE ENCOUNTER
----- Message from Trace Maradiaga MD sent at 7/1/2022  1:48 PM CDT -----  Regarding: PO fu  Please schedule Pema Norris for a 1 week PO f/u with Dr. Welch with prior Ctu and Mag-3 scan (ordered, needs to be scheduled).    Thank you,  NM

## 2022-07-01 NOTE — OR NURSING
VSS on RA. Pt denies pain. PIV C/D/I. Meets PACU discharge criteria. Ready for transfer to [facility]. Report given to Tung CHERRY.

## 2022-07-01 NOTE — H&P
"[facility]  History & Physical    Patient Name: Pema Norris  MRN: [id number]  Patient Class: OP- Observation  Admission Date: 6/30/2022  Attending Physician: Annel Gomez MD   Primary Care Provider: Servando Blackburn MD         Patient information was obtained from patient, past medical records and ER records.     Subjective:     Principal Problem:Acute pyelonephritis    Chief Complaint:   Chief Complaint   Patient presents with    flank pain     Pt c.o left flank pain onset yesterday. Pt was at [facility] and was being transferred here but opted to not come by ems due to "nobody could ride with me"  pt states she was being transferred here for surgery on urethra/kidney.  AAO x 3 nadn skin w.d         HPI: The patient is a 36 y.o. female who presents with complaint of left flank pain onset last night. The patient reports going to [facility] yesterday for her pain, which she describes as severe and "worse than having babies".  Her  notes she had a CT scan of her abdomen and was told she needed surgery to reconnect her ureter to her kidney. She describes being told there was no urologist available at that facility to perform the procedure. The patient was then intended to transfer to [facility] via EMS, but opted not to as her  was unable to ride with her. She reports having a fever of 101.3°F last night, which has since resolved. She describes currently feeling nauseas and light-headed. Of note, the patient has had two ureteral stents placed in her left kidney. She states her most recent stent was placed in February. The patient's urologist is Dr. Osorio.  The patient will be admitted for further management of her left sided hydronephrosis and Urology consult.      Past Medical History:   Diagnosis Date    Disorder of kidney and ureter     Urinary tract infection        No past surgical history on file.    Review of patient's allergies " indicates:  No Known Allergies    No current facility-administered medications on file prior to encounter.     Current Outpatient Medications on File Prior to Encounter   Medication Sig    meloxicam (MOBIC) 15 MG tablet      Family History    None       Tobacco Use    Smoking status: Never Smoker    Smokeless tobacco: Never Used   Substance and Sexual Activity    Alcohol use: Not Currently    Drug use: Not on file    Sexual activity: Not on file     Review of Systems   Constitutional:  Negative for activity change, appetite change and fever.   HENT:  Negative for congestion, ear pain, rhinorrhea and sinus pressure.    Eyes:  Negative for pain and discharge.   Respiratory:  Negative for cough, chest tightness, shortness of breath and wheezing.    Cardiovascular:  Negative for chest pain and leg swelling.   Gastrointestinal:  Negative for abdominal distention, abdominal pain, diarrhea, nausea and vomiting.   Endocrine: Negative for cold intolerance and heat intolerance.   Genitourinary:  Positive for flank pain (left). Negative for difficulty urinating, frequency, hematuria and urgency.   Musculoskeletal:  Negative for arthralgias, joint swelling and myalgias.   Allergic/Immunologic: Negative for environmental allergies and food allergies.   Neurological:  Negative for dizziness, weakness, light-headedness and headaches.   Hematological:  Does not bruise/bleed easily.   Psychiatric/Behavioral:  Negative for agitation, behavioral problems and decreased concentration.    Objective:     Vital Signs (Most Recent):  Temp: 98.4 °F (36.9 °C) (06/30/22 1551)  Pulse: 95 (06/30/22 1551)  Resp: 18 (06/30/22 1551)  BP: (!) 156/62 (06/30/22 1551)  SpO2: 99 % (06/30/22 1551)   Vital Signs (24h Range):  Temp:  [98.4 °F (36.9 °C)] 98.4 °F (36.9 °C)  Pulse:  [91-95] 95  Resp:  [18-20] 18  SpO2:  [96 %-99 %] 99 %  BP: (122-156)/(62-68) 156/62     Weight: 127.9 kg (282 lb)  Body mass index is 42.88 kg/m².    Physical  Exam  Constitutional:       Appearance: Normal appearance. She is well-developed.   HENT:      Head: Normocephalic.   Eyes:      General:         Right eye: No discharge.         Left eye: No discharge.      Conjunctiva/sclera: Conjunctivae normal.   Cardiovascular:      Rate and Rhythm: Regular rhythm. Tachycardia present.      Pulses:           Radial pulses are 2+ on the right side and 2+ on the left side.      Heart sounds: Normal heart sounds.   Pulmonary:      Effort: Pulmonary effort is normal. No respiratory distress.      Breath sounds: Normal breath sounds.   Abdominal:      General: Bowel sounds are increased. There is no distension.      Palpations: Abdomen is soft.      Tenderness: There is no abdominal tenderness.   Musculoskeletal:         General: Normal range of motion.      Cervical back: Normal range of motion and neck supple.   Skin:     General: Skin is warm and dry.   Neurological:      Mental Status: She is alert and oriented to person, place, and time.      GCS: GCS eye subscore is 4. GCS verbal subscore is 5. GCS motor subscore is 6.      Motor: Motor function is intact.   Psychiatric:         Mood and Affect: Mood normal.         Speech: Speech normal.         Behavior: Behavior normal.         Thought Content: Thought content normal.           Significant Labs: All pertinent labs within the past 24 hours have been reviewed.  CBC:   Recent Labs   Lab 06/30/22  1831   WBC 6.74   HGB 10.4*   HCT 31.6*        CMP:   Recent Labs   Lab 06/30/22  1831      K 3.6      CO2 25   GLU 98   BUN 14   CREATININE 0.8   CALCIUM 8.8   PROT 6.8   ALBUMIN 3.0*   BILITOT 0.9   ALKPHOS 106   AST 20   ALT 25   ANIONGAP 11   EGFRNONAA >60       Significant Imaging: I have reviewed all pertinent imaging results/findings within the past 24 hours.    Assessment/Plan:     * Acute pyelonephritis  U/A positive for 3+ leukocytes and many bacteria, nitrite positive.    Rocephin  IVF  Consult  Urology  Urine/Blood cultures pending  Morphine      UPJ obstruction, congenital  Per Dr. Osorio's procedure note.  Presents today with severe left sided hydronephrosis    Consult Urology        VTE Risk Mitigation (From admission, onward)         Ordered     Place sequential compression device  Until discontinued         06/30/22 2110     IP VTE HIGH RISK PATIENT  Once         06/30/22 2110     Place sequential compression device  Until discontinued         06/30/22 2110     Reason for No Pharmacological VTE Prophylaxis  Once        Question:  Reasons:  Answer:  Risk of Bleeding    06/30/22 2110                   Jorge Mendieta NP  Department of Hospital Medicine   [facility] ([place])

## 2022-07-01 NOTE — ANESTHESIA POSTPROCEDURE EVALUATION
Anesthesia Post Evaluation    Patient: Pema Norris    Procedure(s) Performed: Procedure(s) (LRB):  CYSTOURETEROSCOPY, WITH RETROGRADE PYELOGRAM (Left)    Final Anesthesia Type: general      Patient location during evaluation: PACU  Patient participation: Yes- Able to Participate  Level of consciousness: awake and alert  Post-procedure vital signs: reviewed and stable  Pain management: adequate  Airway patency: patent  JAMES mitigation strategies: Extubation while patient is awake  PONV status at discharge: No PONV  Anesthetic complications: no      Cardiovascular status: hemodynamically stable  Respiratory status: unassisted  Hydration status: euvolemic  Follow-up not needed.          Vitals Value Taken Time   /85 07/01/22 1347   Temp 36.3 °C (97.4 °F) 07/01/22 1315   Pulse 76 07/01/22 1356   Resp 16 07/01/22 1345   SpO2 93 % 07/01/22 1356   Vitals shown include unvalidated device data.      No case tracking events are documented in the log.      Pain/Mark Score: Pain Rating Prior to Med Admin: 10 (7/1/2022  6:45 AM)  Pain Rating Post Med Admin: 3 (7/1/2022  7:15 AM)  Mark Score: 9 (7/1/2022  1:45 PM)

## 2022-07-01 NOTE — HOSPITAL COURSE
"Patient with a congenital UPJ obstruction was admitted for urology consultation.  She was continued on IV ceftriaxone empirically for pyelonephritis while awaiting cultures.  She was seen by urology and taken for cystoscopy on 7/1 with left retrograde pyelogram, stone removal and left ureteral JJ stent removal.  Alligator graspers were inserted via the cystoscope and used to fragment stones/incrustations seen along the ureteral stent's external coil, and then the stent was removed.  Stone fragments were sent for analysis.  Patient was returned to her room and her diet was advanced.  IV antibiotics were continued while awaiting cultures.  She was afebrile while she was here and had a normal WBC.  Culture from 6/30 showed no growth, antibiotics discontinued.  She had severe pain overnight after the procedure and remained in the hospital for pain control.  She had 48 hours of toradol every 6 hours around the clock as well as pyridium and IV Dilaudid. On discharge she said she still had pain and had soreness all over "like bruises" although appeared to be sleeping well and tolerated her diet.  She was given a one week prescription of Percocet and the pyridium on discharge and will need to follow up in one week for further imaging as below.    Follow up was arranged through the urology service here, although patient may prefer to follow up with her urologist in [place].  After she has had one week period of ureteral rest with the stent out there should be an imaging/functional study to assess the kidney (Lasix MAG3 renal scan and CT urogram).  If obstruction is noted, ideally a percutaneous nephrostomy would be used for drainage to allow continued ureteral rest in preparation for possible pyeloplasty.  Patient would like this to be done emergently but it is considered an outpatient procedure.  She would prefer to have it done in [place] so she does not have to return to [place] but it was arranged to be " done here in case she changes her mind.

## 2022-07-01 NOTE — SUBJECTIVE & OBJECTIVE
Interval History:  Patient seen after procedure.  Diet advanced and she is tolerating it well.  She does not have any pain currently.    Review of Systems   Constitutional:  Negative for chills and fever.   Respiratory:  Negative for cough and shortness of breath.    Cardiovascular:  Negative for chest pain and palpitations.   Objective:     Vital Signs (Most Recent):  Temp: 98.1 °F (36.7 °C) (07/01/22 1513)  Pulse: 78 (07/01/22 1513)  Resp: 18 (07/01/22 1513)  BP: (!) 142/98 (07/01/22 1513)  SpO2: (!) 93 % (07/01/22 1513)   Vital Signs (24h Range):  Temp:  [97.4 °F (36.3 °C)-98.8 °F (37.1 °C)] 98.1 °F (36.7 °C)  Pulse:  [] 78  Resp:  [16-20] 18  SpO2:  [93 %-98 %] 93 %  BP: (117-158)/(63-98) 142/98     Weight: (!) 144 kg (317 lb 8 oz)  Body mass index is 48.28 kg/m².    Intake/Output Summary (Last 24 hours) at 7/1/2022 1557  Last data filed at 7/1/2022 1312  Gross per 24 hour   Intake 1110 ml   Output 1050 ml   Net 60 ml      Physical Exam  Constitutional:       Appearance: She is obese.      Comments: Sitting up, eating lunch   Cardiovascular:      Rate and Rhythm: Normal rate and regular rhythm.      Heart sounds: No murmur heard.    No gallop.   Pulmonary:      Effort: Pulmonary effort is normal.      Breath sounds: Normal breath sounds.   Skin:     General: Skin is warm and dry.   Neurological:      General: No focal deficit present.      Mental Status: She is alert and oriented to person, place, and time.   Psychiatric:         Mood and Affect: Mood normal.         Behavior: Behavior normal.       Significant Labs: All pertinent labs within the past 24 hours have been reviewed.    Significant Imaging: I have reviewed all pertinent imaging results/findings within the past 24 hours.

## 2022-07-01 NOTE — TRANSFER OF CARE
"Anesthesia Transfer of Care Note    Patient: Pema Norris    Procedure(s) Performed: Procedure(s) (LRB):  CYSTOURETEROSCOPY, WITH RETROGRADE PYELOGRAM (Left)    Patient location: PACU    Anesthesia Type: general    Transport from OR: Transported from OR on room air with adequate spontaneous ventilation    Post pain: adequate analgesia    Post assessment: no apparent anesthetic complications and tolerated procedure well    Post vital signs: stable    Level of consciousness: awake, alert and oriented    Nausea/Vomiting: no nausea/vomiting    Complications: none    Transfer of care protocol was followed      Last vitals:   Visit Vitals  /77 (BP Location: Left arm, Patient Position: Lying)   Pulse 80   Temp 36.3 °C (97.4 °F) (Temporal)   Resp 18   Ht 5' 8" (1.727 m)   Wt (!) 144 kg (317 lb 8 oz)   SpO2 97%   Breastfeeding No   BMI 48.28 kg/m²     "

## 2022-07-01 NOTE — PLAN OF CARE
Recommendations  1. When medically able advance to regular diet as tolerated with texture modified if needed.    -allow for call in option  2. Monitor labs/ wt changes/ appetite changes/ fluid status.    Goals: Pt diet to advance by RD f/u.  Nutrition Goal Status: new  Communication of RD Recs:  (POC)

## 2022-07-01 NOTE — ASSESSMENT & PLAN NOTE
Pema Norris is a 36 y.o. female with known left UPJ obstruction, ureteral stent in place, presenting for worsening left flank pain and possible pyelonephritis.    - Consult d/w Staff Urologist  - Strict I's/O's  - Trend Cr, avoid nephrotoxic agents, and dose medications to patient's current GFR  - NPO  - Consent obtained for cysto with left RPG and left ureteral stent exchange today  - Ucx pending, will f/u results, tailor abx as needed  - All remaining care per primary team  - Urology to follow along

## 2022-07-01 NOTE — CONSULTS
Interventional Radiology    Patient seen by urology with procedure performed. No need for percutaneous nephrostomy tube at present. Please re-consult if situation changes.    Oscar Lewis MD  [phone]

## 2022-07-01 NOTE — PLAN OF CARE
[facility] ([place])  Initial Discharge Assessment       Primary Care Provider: Servando Blackburn MD    Admission Diagnosis: Acute pyelonephritis [N10]    Admission Date: 6/30/2022  Expected Discharge Date:     Discharge Barriers Identified: (P) None    Payor: /     Extended Emergency Contact Information  Primary Emergency Contact: Waqar Olivarez  Mobile Phone: [phone]  Relation: Significant other  Preferred language: English   needed? No    Discharge Plan A: (P) Home with family  Discharge Plan B: (P) Home with family      [id number] - [place], [place] - [address] AT [facility] [address]  [address]  [place] [address]  Phone: [phone] Fax: [phone]      Initial Assessment (most recent)       Adult Discharge Assessment - 07/01/22 1504          Discharge Assessment    Assessment Type Discharge Planning Assessment (P)      Confirmed/corrected address, phone number and insurance Yes (P)      Confirmed Demographics Correct on Facesheet (P)      Source of Information family (P)    Spoke with patient's  at the bedside.  Patient having a stent placed.    If unable to respond/provide information was family/caregiver contacted? Yes (P)      Lives With spouse (P)      Do you expect to return to your current living situation? Yes (P)      Do you have help at home or someone to help you manage your care at home? No (P)      Prior to hospitilization cognitive status: Alert/Oriented (P)      Current cognitive status: Alert/Oriented (P)      Dressing/Bathing Difficulty none (P)      Equipment Currently Used at Home none (P)      Readmission within 30 days? No (P)      Patient currently being followed by outpatient case management? No (P)      Do you currently have service(s) that help you manage your care at home? No (P)      Do you take prescription medications? No (P)      Do you have prescription coverage? Yes (P)      Do you have any  problems affording any of your prescribed medications? No (P)      Is the patient taking medications as prescribed? yes (P)      Who is going to help you get home at discharge?  (P)      How do you get to doctors appointments? car, drives self;family or friend will provide (P)      Are you on dialysis? No (P)      Do you take coumadin? No (P)      Discharge Plan A Home with family (P)      Discharge Plan B Home with family (P)      DME Needed Upon Discharge  respiratory supplies;none (P)      Discharge Plan discussed with: Spouse/sig other (P)      Discharge Barriers Identified None (P)                    CM met with spouse at bedside as patient was having a procedure done.     Patient is alert and oriented with no communication barriers.     Prior to admission patient is independent. Patient denies the use of HH or DME.     Patients PCP is correct on the face sheet. Patient choice pharmacy is bedside delivery.  CM spoke with patient about her insurance. Pt stated that she has Medicaid but that her card is not available.    Advance directives: Unknown    Patients family will transport the patient home at discharge.     No CM needs identified at this time.     CM team will continue to follow.

## 2022-07-01 NOTE — SUBJECTIVE & OBJECTIVE
Past Medical History:   Diagnosis Date    Disorder of kidney and ureter     Urinary tract infection        No past surgical history on file.    Review of patient's allergies indicates:  No Known Allergies    Family History    None         Tobacco Use    Smoking status: Never Smoker    Smokeless tobacco: Never Used   Substance and Sexual Activity    Alcohol use: Not Currently    Drug use: Not on file    Sexual activity: Not on file       Review of Systems   Constitutional:  Negative for chills and fever.   HENT:  Negative for sore throat and trouble swallowing.    Eyes:  Negative for pain and discharge.   Respiratory:  Negative for shortness of breath and wheezing.    Cardiovascular:  Negative for chest pain and palpitations.   Gastrointestinal:  Negative for abdominal pain, diarrhea, nausea and vomiting.   Genitourinary:         As per HPI   Musculoskeletal:  Negative for back pain and joint swelling.   Skin:  Negative for rash and wound.   Neurological:  Negative for seizures, weakness and headaches.   Psychiatric/Behavioral:  Negative for hallucinations. The patient is not nervous/anxious.      Objective:     Temp:  [97.7 °F (36.5 °C)-98.8 °F (37.1 °C)] 98 °F (36.7 °C)  Pulse:  [] 92  Resp:  [16-20] 18  SpO2:  [95 %-99 %] 95 %  BP: (117-158)/(62-82) 117/67     Body mass index is 48.28 kg/m².           Drains       None                   Physical Exam  Vitals and nursing note reviewed.   Constitutional:       General: She is not in acute distress.  HENT:      Head: Atraumatic.      Nose: Nose normal.   Eyes:      Extraocular Movements: Extraocular movements intact.   Cardiovascular:      Rate and Rhythm: Normal rate.   Pulmonary:      Effort: Pulmonary effort is normal.   Abdominal:      General: Abdomen is flat.      Tenderness: There is no abdominal tenderness. There is no right CVA tenderness or left CVA tenderness.   Musculoskeletal:         General: Normal range of motion.      Cervical back: Normal  range of motion.   Neurological:      General: No focal deficit present.      Mental Status: She is alert. Mental status is at baseline.   Psychiatric:         Mood and Affect: Mood normal.         Behavior: Behavior normal.         Thought Content: Thought content normal.         Judgment: Judgment normal.       Significant Labs:    BMP:  Recent Labs   Lab 06/30/22 1831 07/01/22  0454    141   K 3.6 4.0    105   CO2 25 26   BUN 14 13   CREATININE 0.8 0.8   CALCIUM 8.8 8.7       CBC:  Recent Labs   Lab 06/30/22 1831 07/01/22  0454   WBC 6.74 5.20   HGB 10.4* 10.4*   HCT 31.6* 32.2*    190       All pertinent labs results from the past 24 hours have been reviewed.    Significant Imaging:  All pertinent imaging results/findings from the past 24 hours have been reviewed.

## 2022-07-01 NOTE — PROGRESS NOTES
"[facility] ([place])  Adult Nutrition  Progress Note    SUMMARY       Recommendations  1. When medically able advance to regular diet as tolerated with texture modified if needed.    -allow for call in option  2. Monitor labs/ wt changes/ appetite changes/ fluid status.    Goals: Pt diet to advance by RD f/u.  Nutrition Goal Status: new  Communication of RD Recs:  (POC)    Assessment and Plan    Nutrition Problem  Inadequate oral intake    Related to (etiology):   Decreased appetite     Signs and Symptoms (as evidenced by):   NPO at this time  PTA with L flank pain from kidney with minimal PO intake per pt    Interventions(treatment strategy):  Collaboration with other healthcare providers  Regular diet    Nutrition Diagnosis Status:   New      Reason for Assessment    Reason For Assessment: identified at risk by screening criteria (MST of 3)  Diagnosis:  (acute pyelonephtitis)  Relevant Medical History:   Past Medical History:   Diagnosis Date    Disorder of kidney and ureter     Urinary tract infection        Interdisciplinary Rounds: did not attend  General Information Comments: 7/1- Pt seen 2/2 MST of 3- wt loss report of 14-23lb and a decrease in PO intake 2/2 decreased appetite. Presented with L flank pain. Remains NPO. Reports her appetite has been decreased 2/2 her kindey pain. Reports she her appetiet is back and could eat a house. When asked about wt loss- reports she experienced some from stress. Wt stable x 1 year. No N/V/D. LBM 6/30. Wisam Score 19. NFPE not warrnated. Will continue to monitor.    Nutrition Discharge Planning: Pt to follow a general healthful diet with texture modified if needed/ tolerated.    Nutrition Risk Screen    Nutrition Risk Screen: no indicators present    Nutrition/Diet History    Factors Affecting Nutritional Intake: NPO    Anthropometrics    Temp: 98 °F (36.7 °C)  Height Method: Stated  Height: 5' 8" (172.7 cm)  Height (inches): 68 in  Weight Method: Bed " Scale  Weight: (!) 144 kg (317 lb 8 oz)  Weight (lb): (!) 317.5 lb  Ideal Body Weight (IBW), Female: 140 lb  % Ideal Body Weight, Female (lb): 226.79 %  BMI (Calculated): 48.3  BMI Grade: greater than 40 - morbid obesity     Lab/Procedures/Meds    Pertinent Labs Reviewed: reviewed  Pertinent Labs Comments: glu 117, H/H 10.4/32.2  Pertinent Medications Reviewed: reviewed  Pertinent Medications Comments: ceftiraxone, 0.9% NaCl @ 75 mL/hr    Estimated/Assessed Needs    Weight Used For Calorie Calculations: (!) 144 kg (317 lb 7.4 oz)  Energy Calorie Requirements (kcal): 2100 kcal day  Energy Need Method: [place]  Protein Requirements: 115-144 g day (0.8-1.0 g/kg)  Weight Used For Protein Calculations: (!) 144 kg (317 lb 7.4 oz)  Estimated Fluid Requirement Method: RDA Method  RDA Method (mL): 2100  CHO Requirement: 263 g day    Nutrition Prescription Ordered    Current Diet Order: NPO    Evaluation of Received Nutrient/Fluid Intake    I/O: -0.69L since admit  Energy Calories Required: not meeting needs  Protein Required: not meeting needs  Fluid Required: not meeting needs  Comments: LBM 6/30  % Intake of Estimated Energy Needs: 0 %  % Meal Intake: NPO    Nutrition Risk    Level of Risk/Frequency of Follow-up:  (2x weekly)     Monitor and Evaluation    Food and Nutrient Intake: food and beverage intake, energy intake  Food and Nutrient Adminstration: diet order  Knowledge/Beliefs/Attitudes: food and nutrition knowledge/skill  Physical Activity and Function: nutrition-related ADLs and IADLs  Anthropometric Measurements: weight, weight change, body mass index  Biochemical Data, Medical Tests and Procedures: glucose/endocrine profile, gastrointestinal profile, electrolyte and renal panel, inflammatory profile, lipid profile  Nutrition-Focused Physical Findings: overall appearance     Nutrition Follow-Up    RD Follow-up?: Yes

## 2022-07-01 NOTE — ANESTHESIA PROCEDURE NOTES
Intubation    Date/Time: 7/1/2022 12:20 PM  Performed by: Nory Clarke CRNA  Authorized by: Esa Mercado MD     Intubation:     Induction:  Intravenous    Intubated:  Postinduction    Mask Ventilation:  Easy mask    Attempts:  1    Attempted By:  CRNA    Method of Intubation:  Video laryngoscopy    Blade:  Meyer 3    Laryngeal View Grade: Grade I - full view of cords      Difficult Airway Encountered?: No      Complications:  None    Airway Device:  Oral endotracheal tube    Airway Device Size:  7.5    Style/Cuff Inflation:  Cuffed (inflated to minimal occlusive pressure)    Tube secured:  22    Secured at:  The lips    Placement Verified By:  Capnometry    Complicating Factors:  Obesity    Findings Post-Intubation:  BS equal bilateral and atraumatic/condition of teeth unchanged

## 2022-07-01 NOTE — PROGRESS NOTES
"[facility] Medicine  Progress Note    Patient Name: Pema Norris  MRN: [id number]  Patient Class: IP- Inpatient   Admission Date: 6/30/2022  Length of Stay: 0 days  Attending Physician: Annel Gomez MD  Primary Care Provider: Servando Blackburn MD        Subjective:     Principal Problem:Acute pyelonephritis        HPI:  From H&P by Jorge Mendieta NP:  "The patient is a 36 y.o. female who presents with complaint of left flank pain onset last night. The patient reports going to [facility] yesterday for her pain, which she describes as severe and "worse than having babies".  Her  notes she had a CT scan of her abdomen and was told she needed surgery to reconnect her ureter to her kidney. She describes being told there was no urologist available at that facility to perform the procedure. The patient was then intended to transfer to [facility] via EMS, but opted not to as her  was unable to ride with her. She reports having a fever of 101.3°F last night, which has since resolved. She describes currently feeling nauseas and light-headed. Of note, the patient has had two ureteral stents placed in her left kidney. She states her most recent stent was placed in February. The patient's urologist is Dr. Osorio.  The patient will be admitted for further management of her left sided hydronephrosis and Urology consult."    Review of transfer center note from the canceled transfer includes CT reading which states there was severe left sided hydronephrosis with edematous changes around the left kidney and double J ureteral stent.      Overview/Hospital Course:  Patient with a congenital UPJ obstruction was admitted for urology consultation.  She was continued on IV ceftriaxone empirically for pyelonephritis while awaiting cultures.  She was seen by urology and taken for cystoscopy on 7/1 with left retrograde pyelogram, stone removal and left ureteral JJ stent removal.  " Alligator graspers were inserted via the cystoscope and used to fragment stones/incrustations seen along the ureteral stent's external coil, and then the stent was removed.  Stone fragments were sent for analysis.  Patient was returned to her room and her diet was advanced.  IV antibiotics were continued while awaiting cultures.  She was afebrile while she was here and had a normal WBC.      Interval History:  Patient seen after procedure.  Diet advanced and she is tolerating it well.  She does not have any pain currently.    Review of Systems   Constitutional:  Negative for chills and fever.   Respiratory:  Negative for cough and shortness of breath.    Cardiovascular:  Negative for chest pain and palpitations.   Objective:     Vital Signs (Most Recent):  Temp: 98.1 °F (36.7 °C) (07/01/22 1513)  Pulse: 78 (07/01/22 1513)  Resp: 18 (07/01/22 1513)  BP: (!) 142/98 (07/01/22 1513)  SpO2: (!) 93 % (07/01/22 1513)   Vital Signs (24h Range):  Temp:  [97.4 °F (36.3 °C)-98.8 °F (37.1 °C)] 98.1 °F (36.7 °C)  Pulse:  [] 78  Resp:  [16-20] 18  SpO2:  [93 %-98 %] 93 %  BP: (117-158)/(63-98) 142/98     Weight: (!) 144 kg (317 lb 8 oz)  Body mass index is 48.28 kg/m².    Intake/Output Summary (Last 24 hours) at 7/1/2022 1557  Last data filed at 7/1/2022 1312  Gross per 24 hour   Intake 1110 ml   Output 1050 ml   Net 60 ml      Physical Exam  Constitutional:       Appearance: She is obese.      Comments: Sitting up, eating lunch   Cardiovascular:      Rate and Rhythm: Normal rate and regular rhythm.      Heart sounds: No murmur heard.    No gallop.   Pulmonary:      Effort: Pulmonary effort is normal.      Breath sounds: Normal breath sounds.   Skin:     General: Skin is warm and dry.   Neurological:      General: No focal deficit present.      Mental Status: She is alert and oriented to person, place, and time.   Psychiatric:         Mood and Affect: Mood normal.         Behavior: Behavior normal.       Significant Labs:  "All pertinent labs within the past 24 hours have been reviewed.    Significant Imaging: I have reviewed all pertinent imaging results/findings within the past 24 hours.      Assessment/Plan:      * Acute pyelonephritis  Patient with chronic congenital UPJ obstruction and left ureteral double-J stent in place presented to [place] with severe left flank pain.  She is on empiric antibiotics for pyelonephritis given UA findings, although it seems pain was due to inflammation involving the stent.  Stent was due for removal and was taken out by urology, left out for a period of "ureteral rest."  Continue ceftriaxone while awaiting cultures.  Blood cultures NGTD.    Morbid obesity with BMI of 45.0-49.9, adult  Noted      UPJ obstruction, congenital  Patient with double J stent in place.  Urology consulted and performed cystoscopy with left retrograde pyelogram, stone removal and left ureteral JJ stent removal.  Dr. Welch noted patency of the UPJ and will leave the stent out for now.  Follow up in one week for Lasix renal scan and CT urogram.  Depending on results pyeloplasty might be needed.      VTE Risk Mitigation (From admission, onward)         Ordered     Place sequential compression device  Until discontinued         06/30/22 2110     IP VTE HIGH RISK PATIENT  Once         06/30/22 2110     Place sequential compression device  Until discontinued         06/30/22 2110     Reason for No Pharmacological VTE Prophylaxis  Once        Question:  Reasons:  Answer:  Risk of Bleeding    06/30/22 2110                          Annel Guerra MD  Department of Hospital Medicine   [facility] ([place])  "

## 2022-07-02 LAB
ALBUMIN SERPL BCP-MCNC: 3 G/DL (ref 3.5–5.2)
ALP SERPL-CCNC: 106 U/L (ref 55–135)
ALT SERPL W/O P-5'-P-CCNC: 21 U/L (ref 10–44)
ANION GAP SERPL CALC-SCNC: 11 MMOL/L (ref 8–16)
AST SERPL-CCNC: 16 U/L (ref 10–40)
BACTERIA UR CULT: NO GROWTH
BACTERIA UR CULT: NORMAL
BASOPHILS # BLD AUTO: 0.01 K/UL (ref 0–0.2)
BASOPHILS NFR BLD: 0.1 % (ref 0–1.9)
BILIRUB SERPL-MCNC: 0.3 MG/DL (ref 0.1–1)
BUN SERPL-MCNC: 12 MG/DL (ref 6–20)
CALCIUM SERPL-MCNC: 9 MG/DL (ref 8.7–10.5)
CHLORIDE SERPL-SCNC: 102 MMOL/L (ref 95–110)
CO2 SERPL-SCNC: 24 MMOL/L (ref 23–29)
CREAT SERPL-MCNC: 0.9 MG/DL (ref 0.5–1.4)
DIFFERENTIAL METHOD: ABNORMAL
EOSINOPHIL # BLD AUTO: 0 K/UL (ref 0–0.5)
EOSINOPHIL NFR BLD: 0 % (ref 0–8)
ERYTHROCYTE [DISTWIDTH] IN BLOOD BY AUTOMATED COUNT: 13.7 % (ref 11.5–14.5)
EST. GFR  (AFRICAN AMERICAN): >60 ML/MIN/1.73 M^2
EST. GFR  (NON AFRICAN AMERICAN): >60 ML/MIN/1.73 M^2
GLUCOSE SERPL-MCNC: 185 MG/DL (ref 70–110)
HCT VFR BLD AUTO: 32.1 % (ref 37–48.5)
HGB BLD-MCNC: 10.7 G/DL (ref 12–16)
IMM GRANULOCYTES # BLD AUTO: 0.12 K/UL (ref 0–0.04)
IMM GRANULOCYTES NFR BLD AUTO: 1.2 % (ref 0–0.5)
LYMPHOCYTES # BLD AUTO: 0.9 K/UL (ref 1–4.8)
LYMPHOCYTES NFR BLD: 8.4 % (ref 18–48)
MAGNESIUM SERPL-MCNC: 1.9 MG/DL (ref 1.6–2.6)
MCH RBC QN AUTO: 27.4 PG (ref 27–31)
MCHC RBC AUTO-ENTMCNC: 33.3 G/DL (ref 32–36)
MCV RBC AUTO: 82 FL (ref 82–98)
MONOCYTES # BLD AUTO: 0.4 K/UL (ref 0.3–1)
MONOCYTES NFR BLD: 3.8 % (ref 4–15)
NEUTROPHILS # BLD AUTO: 9 K/UL (ref 1.8–7.7)
NEUTROPHILS NFR BLD: 86.5 % (ref 38–73)
NRBC BLD-RTO: 0 /100 WBC
PHOSPHATE SERPL-MCNC: 2.2 MG/DL (ref 2.7–4.5)
PLATELET # BLD AUTO: 248 K/UL (ref 150–450)
PMV BLD AUTO: 9.7 FL (ref 9.2–12.9)
POTASSIUM SERPL-SCNC: 4.3 MMOL/L (ref 3.5–5.1)
PROT SERPL-MCNC: 7 G/DL (ref 6–8.4)
RBC # BLD AUTO: 3.9 M/UL (ref 4–5.4)
SODIUM SERPL-SCNC: 137 MMOL/L (ref 136–145)
WBC # BLD AUTO: 10.35 K/UL (ref 3.9–12.7)

## 2022-07-02 PROCEDURE — 99233 PR SUBSEQUENT HOSPITAL CARE,LEVL III: ICD-10-PCS | Mod: ,,, | Performed by: UROLOGY

## 2022-07-02 PROCEDURE — 83735 ASSAY OF MAGNESIUM: CPT | Performed by: NURSE PRACTITIONER

## 2022-07-02 PROCEDURE — 99233 SBSQ HOSP IP/OBS HIGH 50: CPT | Mod: ,,, | Performed by: HOSPITALIST

## 2022-07-02 PROCEDURE — 25000003 PHARM REV CODE 250: Performed by: ANESTHESIOLOGY

## 2022-07-02 PROCEDURE — 25000003 PHARM REV CODE 250

## 2022-07-02 PROCEDURE — 94761 N-INVAS EAR/PLS OXIMETRY MLT: CPT

## 2022-07-02 PROCEDURE — 25000003 PHARM REV CODE 250: Performed by: NURSE PRACTITIONER

## 2022-07-02 PROCEDURE — 99233 SBSQ HOSP IP/OBS HIGH 50: CPT | Mod: ,,, | Performed by: UROLOGY

## 2022-07-02 PROCEDURE — 85025 COMPLETE CBC W/AUTO DIFF WBC: CPT | Performed by: NURSE PRACTITIONER

## 2022-07-02 PROCEDURE — 11000001 HC ACUTE MED/SURG PRIVATE ROOM

## 2022-07-02 PROCEDURE — 25000003 PHARM REV CODE 250: Performed by: HOSPITALIST

## 2022-07-02 PROCEDURE — 63600175 PHARM REV CODE 636 W HCPCS: Performed by: HOSPITALIST

## 2022-07-02 PROCEDURE — 99233 PR SUBSEQUENT HOSPITAL CARE,LEVL III: ICD-10-PCS | Mod: ,,, | Performed by: HOSPITALIST

## 2022-07-02 PROCEDURE — 63600175 PHARM REV CODE 636 W HCPCS: Performed by: NURSE PRACTITIONER

## 2022-07-02 PROCEDURE — 84100 ASSAY OF PHOSPHORUS: CPT | Performed by: NURSE PRACTITIONER

## 2022-07-02 PROCEDURE — 80053 COMPREHEN METABOLIC PANEL: CPT | Performed by: NURSE PRACTITIONER

## 2022-07-02 PROCEDURE — 36415 COLL VENOUS BLD VENIPUNCTURE: CPT | Performed by: NURSE PRACTITIONER

## 2022-07-02 RX ORDER — KETOROLAC TROMETHAMINE 30 MG/ML
30 INJECTION, SOLUTION INTRAMUSCULAR; INTRAVENOUS ONCE
Status: COMPLETED | OUTPATIENT
Start: 2022-07-02 | End: 2022-07-02

## 2022-07-02 RX ORDER — SIMETHICONE 80 MG
1 TABLET,CHEWABLE ORAL 3 TIMES DAILY PRN
Status: DISCONTINUED | OUTPATIENT
Start: 2022-07-02 | End: 2022-07-04 | Stop reason: HOSPADM

## 2022-07-02 RX ORDER — HYDROMORPHONE HYDROCHLORIDE 1 MG/ML
0.5 INJECTION, SOLUTION INTRAMUSCULAR; INTRAVENOUS; SUBCUTANEOUS EVERY 4 HOURS PRN
Status: DISCONTINUED | OUTPATIENT
Start: 2022-07-02 | End: 2022-07-04 | Stop reason: HOSPADM

## 2022-07-02 RX ORDER — BACLOFEN 5 MG/1
5 TABLET ORAL 3 TIMES DAILY PRN
Status: DISCONTINUED | OUTPATIENT
Start: 2022-07-02 | End: 2022-07-04 | Stop reason: HOSPADM

## 2022-07-02 RX ORDER — HYOSCYAMINE SULFATE 0.12 MG/1
0.12 TABLET SUBLINGUAL 3 TIMES DAILY
Status: COMPLETED | OUTPATIENT
Start: 2022-07-02 | End: 2022-07-04

## 2022-07-02 RX ORDER — KETOROLAC TROMETHAMINE 30 MG/ML
30 INJECTION, SOLUTION INTRAMUSCULAR; INTRAVENOUS EVERY 6 HOURS
Status: DISCONTINUED | OUTPATIENT
Start: 2022-07-02 | End: 2022-07-04 | Stop reason: HOSPADM

## 2022-07-02 RX ADMIN — BACLOFEN 5 MG: 5 TABLET ORAL at 01:07

## 2022-07-02 RX ADMIN — OXYCODONE 5 MG: 5 TABLET ORAL at 05:07

## 2022-07-02 RX ADMIN — HYDROMORPHONE HYDROCHLORIDE 0.5 MG: 1 INJECTION, SOLUTION INTRAMUSCULAR; INTRAVENOUS; SUBCUTANEOUS at 01:07

## 2022-07-02 RX ADMIN — BACLOFEN 5 MG: 5 TABLET ORAL at 11:07

## 2022-07-02 RX ADMIN — HYDROMORPHONE HYDROCHLORIDE 0.5 MG: 1 INJECTION, SOLUTION INTRAMUSCULAR; INTRAVENOUS; SUBCUTANEOUS at 09:07

## 2022-07-02 RX ADMIN — KETOROLAC TROMETHAMINE 30 MG: 30 INJECTION, SOLUTION INTRAMUSCULAR; INTRAVENOUS at 02:07

## 2022-07-02 RX ADMIN — HYDROMORPHONE HYDROCHLORIDE 0.5 MG: 1 INJECTION, SOLUTION INTRAMUSCULAR; INTRAVENOUS; SUBCUTANEOUS at 05:07

## 2022-07-02 RX ADMIN — SIMETHICONE 80 MG: 80 TABLET, CHEWABLE ORAL at 01:07

## 2022-07-02 RX ADMIN — HYDROMORPHONE HYDROCHLORIDE 0.5 MG: 1 INJECTION, SOLUTION INTRAMUSCULAR; INTRAVENOUS; SUBCUTANEOUS at 07:07

## 2022-07-02 RX ADMIN — OXYCODONE 5 MG: 5 TABLET ORAL at 11:07

## 2022-07-02 RX ADMIN — HYOSCYAMINE SULFATE 0.12 MG: 0.12 TABLET ORAL; SUBLINGUAL at 04:07

## 2022-07-02 RX ADMIN — OXYCODONE 5 MG: 5 TABLET ORAL at 03:07

## 2022-07-02 RX ADMIN — HYOSCYAMINE SULFATE 0.12 MG: 0.12 TABLET ORAL; SUBLINGUAL at 07:07

## 2022-07-02 RX ADMIN — OXYCODONE 5 MG: 5 TABLET ORAL at 07:07

## 2022-07-02 RX ADMIN — SODIUM CHLORIDE: 0.9 INJECTION, SOLUTION INTRAVENOUS at 09:07

## 2022-07-02 RX ADMIN — KETOROLAC TROMETHAMINE 30 MG: 30 INJECTION, SOLUTION INTRAMUSCULAR; INTRAVENOUS at 05:07

## 2022-07-02 NOTE — SUBJECTIVE & OBJECTIVE
Interval History:  She had severe pain overnight requiring multiple doses of Dilaudid. Says she never had pain like this with previous procedures and thinks there must be something wrong.  Seen by urologist, recommendations made for pain control.    Review of Systems   Constitutional:  Negative for chills and fever.   Respiratory:  Negative for cough and shortness of breath.    Cardiovascular:  Negative for chest pain and palpitations.   Genitourinary:  Positive for flank pain.   Objective:     Vital Signs (Most Recent):  Temp: 98 °F (36.7 °C) (07/02/22 1115)  Pulse: 95 (07/02/22 1115)  Resp: (!) 8 (07/02/22 1357)  BP: 128/66 (07/02/22 1115)  SpO2: 96 % (07/02/22 1115)   Vital Signs (24h Range):  Temp:  [97.8 °F (36.6 °C)-98.6 °F (37 °C)] 98 °F (36.7 °C)  Pulse:  [75-95] 95  Resp:  [8-22] 8  SpO2:  [93 %-100 %] 96 %  BP: (128-178)/(66-98) 128/66     Weight: (!) 144 kg (317 lb 8 oz)  Body mass index is 48.28 kg/m².    Intake/Output Summary (Last 24 hours) at 7/2/2022 1504  Last data filed at 7/2/2022 0800  Gross per 24 hour   Intake --   Output 2600 ml   Net -2600 ml      Physical Exam  Constitutional:       General: She is in acute distress.      Appearance: She is obese.   Cardiovascular:      Rate and Rhythm: Normal rate and regular rhythm.      Heart sounds: No murmur heard.    No gallop.   Pulmonary:      Effort: Pulmonary effort is normal.      Breath sounds: Normal breath sounds.   Abdominal:      Comments: Left flank tenderness on palpation.   Skin:     General: Skin is warm and dry.   Neurological:      General: No focal deficit present.      Mental Status: She is alert and oriented to person, place, and time.   Psychiatric:         Mood and Affect: Mood normal.         Behavior: Behavior normal.       Significant Labs: All pertinent labs within the past 24 hours have been reviewed.    Significant Imaging: I have reviewed all pertinent imaging results/findings within the past 24 hours.

## 2022-07-02 NOTE — PROGRESS NOTES
"[facility] Medicine  Progress Note    Patient Name: Pema Norris  MRN: [id number]  Patient Class: IP- Inpatient   Admission Date: 6/30/2022  Length of Stay: 1 days  Attending Physician: Annel Gomez MD  Primary Care Provider: Servando Blackburn MD        Subjective:     Principal Problem:Acute pyelonephritis        HPI:  From H&P by Jorge Mendieta NP:  "The patient is a 36 y.o. female who presents with complaint of left flank pain onset last night. The patient reports going to [facility] yesterday for her pain, which she describes as severe and "worse than having babies".  Her  notes she had a CT scan of her abdomen and was told she needed surgery to reconnect her ureter to her kidney. She describes being told there was no urologist available at that facility to perform the procedure. The patient was then intended to transfer to [facility] via EMS, but opted not to as her  was unable to ride with her. She reports having a fever of 101.3°F last night, which has since resolved. She describes currently feeling nauseas and light-headed. Of note, the patient has had two ureteral stents placed in her left kidney. She states her most recent stent was placed in February. The patient's urologist is Dr. Osorio.  The patient will be admitted for further management of her left sided hydronephrosis and Urology consult."    Review of transfer center note from the canceled transfer includes CT reading which states there was severe left sided hydronephrosis with edematous changes around the left kidney and double J ureteral stent.      Overview/Hospital Course:  Patient with a congenital UPJ obstruction was admitted for urology consultation.  She was continued on IV ceftriaxone empirically for pyelonephritis while awaiting cultures.  She was seen by urology and taken for cystoscopy on 7/1 with left retrograde pyelogram, stone removal and left ureteral JJ stent removal.  " Alligator graspers were inserted via the cystoscope and used to fragment stones/incrustations seen along the ureteral stent's external coil, and then the stent was removed.  Stone fragments were sent for analysis.  Patient was returned to her room and her diet was advanced.  IV antibiotics were continued while awaiting cultures.  She was afebrile while she was here and had a normal WBC.  Culture from 6/30 showed no growth, antibiotics discontinued.  She had severe pain overnight after the procedure and remained in the hospital for pain control.      Interval History:  She had severe pain overnight requiring multiple doses of Dilaudid. Says she never had pain like this with previous procedures and thinks there must be something wrong.  Seen by urologist, recommendations made for pain control.    Review of Systems   Constitutional:  Negative for chills and fever.   Respiratory:  Negative for cough and shortness of breath.    Cardiovascular:  Negative for chest pain and palpitations.   Genitourinary:  Positive for flank pain.   Objective:     Vital Signs (Most Recent):  Temp: 98 °F (36.7 °C) (07/02/22 1115)  Pulse: 95 (07/02/22 1115)  Resp: (!) 8 (07/02/22 1357)  BP: 128/66 (07/02/22 1115)  SpO2: 96 % (07/02/22 1115)   Vital Signs (24h Range):  Temp:  [97.8 °F (36.6 °C)-98.6 °F (37 °C)] 98 °F (36.7 °C)  Pulse:  [75-95] 95  Resp:  [8-22] 8  SpO2:  [93 %-100 %] 96 %  BP: (128-178)/(66-98) 128/66     Weight: (!) 144 kg (317 lb 8 oz)  Body mass index is 48.28 kg/m².    Intake/Output Summary (Last 24 hours) at 7/2/2022 1504  Last data filed at 7/2/2022 0800  Gross per 24 hour   Intake --   Output 2600 ml   Net -2600 ml      Physical Exam  Constitutional:       General: She is in acute distress.      Appearance: She is obese.   Cardiovascular:      Rate and Rhythm: Normal rate and regular rhythm.      Heart sounds: No murmur heard.    No gallop.   Pulmonary:      Effort: Pulmonary effort is normal.      Breath sounds: Normal  "breath sounds.   Abdominal:      Comments: Left flank tenderness on palpation.   Skin:     General: Skin is warm and dry.   Neurological:      General: No focal deficit present.      Mental Status: She is alert and oriented to person, place, and time.   Psychiatric:         Mood and Affect: Mood normal.         Behavior: Behavior normal.       Significant Labs: All pertinent labs within the past 24 hours have been reviewed.    Significant Imaging: I have reviewed all pertinent imaging results/findings within the past 24 hours.      Assessment/Plan:      * Acute pyelonephritis  Patient with chronic congenital UPJ obstruction and left ureteral double-J stent in place presented to [place] with severe left flank pain.  She is on empiric antibiotics for pyelonephritis given UA findings, although it seems pain was due to inflammation involving the stent.  Stent was due for removal and was taken out by urology, left out for a period of "ureteral rest."  Continue ceftriaxone while awaiting cultures.  Blood cultures NGTD.  Urine culture also NGTD today.  Flank pain likely due to the obstructed stent with encrusted stones and its removal.  Urologist recommends toradol, will order 30 mg every 6 hours around the clock plus Levsin for spasms as needed.  She still has Dilaudid ordered and oxycodone, will use these as needed.    Morbid obesity with BMI of 45.0-49.9, adult  Noted      UPJ obstruction, congenital  Patient with double J stent in place.  Urology consulted and performed cystoscopy with left retrograde pyelogram, stone removal and left ureteral JJ stent removal.  Dr. Welch noted patency of the UPJ and will leave the stent out for now.  Follow up in one week for Lasix renal scan and CT urogram.  Depending on results pyeloplasty might be needed.      VTE Risk Mitigation (From admission, onward)         Ordered     Place sequential compression device  Until discontinued         06/30/22 2110     IP VTE HIGH RISK " PATIENT  Once         06/30/22 2110     Place sequential compression device  Until discontinued         06/30/22 2110     Reason for No Pharmacological VTE Prophylaxis  Once        Question:  Reasons:  Answer:  Risk of Bleeding    06/30/22 2110                      Annel Guerra MD  Department of Hospital Medicine   [facility] Surg ([place])

## 2022-07-02 NOTE — ASSESSMENT & PLAN NOTE
Pema Norris is a 36 y.o. female with known left UPJ obstruction, ureteral stent in place, presenting for worsening left flank pain and possible pyelonephritis.     - Cr normal   - L ureteral stent removed 7/1/22 by Dr Welch. No obstruction noted on RPG therefore stent not replaced.   - Recommended for imaging/functional study to assess kidney after ureteral rest with stent out x 1 week (lasix MAG3 renal scan and CT urogram). If obstruction noted, ideally percutaneous nephrostomy would be used for drainage to allow continued ureteral rest in preparation for possible pyeloplasty.    - Emergent/urgent pyeloplasty is not indicated. This is an outpatient procedure.    - Toradal PRN may be helpful for flank pain    Okay to discharge home from  standpoint.

## 2022-07-02 NOTE — PROGRESS NOTES
"[facility])  Urology  Progress Note    Patient Name: Pema Norris  MRN: [id number]  Admission Date: 6/30/2022  Hospital Length of Stay: 1 days  Code Status: Full Code   Attending Provider: Annel Gomez MD   Primary Care Physician: Servando Blackburn MD    Subjective:     HPI:  Pema Norris is a 36 y.o. female who presents from [facility] for severe left flank pain and fevers/chills with known history of left UPJ obstructions, left ureteral stent in place, last exchanged in February of 2022. She is an established urology patient of Dr. Osorio. He diagnosed her with left UPJ obstruction with crossing vessels back in August of 2021 with Mag3 scan showing delayed left nephrogram with functionality of 62% and 37% of the right and left kidney, respectively. A recommendation was made to have a possible left pyeloplasty, but as of now, no surgeries have been conducted or are planned. At bedside, she is AFVSS with pain well controlled. Cr 0.8 (baseline), WBC 5.2, hgb 10.4. UA with 3+OB, 3+ leuks, and nitrite positive. Micro-UA with 75RBC, 50WBC and many bacteria. Ucx pending. She remains on CTX.      Interval History: Reports intermittent "spasms" in L side with L flank pain. She is upset that she has pain in her side after stent removal.     Review of Systems   Constitutional:  Negative for chills and fever.   HENT:  Negative for sore throat and trouble swallowing.    Eyes:  Negative for pain and discharge.   Respiratory:  Negative for shortness of breath and wheezing.    Cardiovascular:  Negative for chest pain and palpitations.   Gastrointestinal:  Negative for abdominal pain, diarrhea, nausea and vomiting.   Genitourinary:  Positive for flank pain.        As per HPI   Musculoskeletal:  Negative for back pain and joint swelling.   Skin:  Negative for rash and wound.   Neurological:  Negative for seizures, weakness and headaches.   Psychiatric/Behavioral:  Negative for hallucinations. The " patient is not nervous/anxious.    Objective:     Temp:  [97.4 °F (36.3 °C)-98.6 °F (37 °C)] 98 °F (36.7 °C)  Pulse:  [75-95] 95  Resp:  [16-22] 20  SpO2:  [93 %-100 %] 96 %  BP: (121-178)/(66-98) 128/66     Body mass index is 48.28 kg/m².    Date 07/02/22 0700 - 07/03/22 0659   Shift 1708-8385 6827-6411 0149-3074 24 Hour Total   INTAKE   Shift Total(mL/kg)       OUTPUT   Urine(mL/kg/hr) 700   700   Shift Total(mL/kg) 700(4.9)   700(4.9)   Weight (kg) 144 144 144 144          Drains       None                   Physical Exam  Vitals and nursing note reviewed.   Constitutional:       General: She is not in acute distress.  HENT:      Head: Atraumatic.      Nose: Nose normal.   Eyes:      Extraocular Movements: Extraocular movements intact.   Cardiovascular:      Rate and Rhythm: Normal rate.   Pulmonary:      Effort: Pulmonary effort is normal.   Abdominal:      Tenderness: There is no abdominal tenderness. There is left CVA tenderness. There is no right CVA tenderness.      Comments: obese   Musculoskeletal:         General: Normal range of motion.      Cervical back: Normal range of motion.   Neurological:      General: No focal deficit present.      Mental Status: She is alert. Mental status is at baseline.   Psychiatric:         Mood and Affect: Mood normal.         Behavior: Behavior normal.         Thought Content: Thought content normal.         Judgment: Judgment normal.       Significant Labs:    BMP:  Recent Labs   Lab 06/30/22 1831 07/01/22  0454 07/02/22  0354    141 137   K 3.6 4.0 4.3    105 102   CO2 25 26 24   BUN 14 13 12   CREATININE 0.8 0.8 0.9   CALCIUM 8.8 8.7 9.0       CBC:   Recent Labs   Lab 06/30/22 1831 07/01/22  0454 07/02/22  0354   WBC 6.74 5.20 10.35   HGB 10.4* 10.4* 10.7*   HCT 31.6* 32.2* 32.1*    190 248       Blood Culture: No results for input(s): LABBLOO in the last 168 hours.  Urine Culture:   Recent Labs   Lab 06/30/22  1709   LABURIN No significant growth      Urine Studies:   Recent Labs   Lab 06/30/22  1709   COLORU Yellow   APPEARANCEUA Clear   PHUR 6.0   SPECGRAV 1.015   PROTEINUA Trace*   GLUCUA Negative   KETONESU Negative   BILIRUBINUA Negative   OCCULTUA 3+*   NITRITE Positive*   UROBILINOGEN Negative   LEUKOCYTESUR 3+*   RBCUA 75*   WBCUA 50*   BACTERIA Many*       Significant Imaging:  All pertinent imaging results/findings from the past 24 hours have been reviewed.                    Assessment/Plan:     * Acute pyelonephritis   - Recent UCx negative (2 days ago)   - UCx this admission pending    UPJ obstruction, congenital  Pema Norris is a 36 y.o. female with known left UPJ obstruction, ureteral stent in place, presenting for worsening left flank pain and possible pyelonephritis.     - Cr normal   - L ureteral stent removed 7/1/22 by Dr Welch. No obstruction noted on RPG therefore stent not replaced.   - Recommended for imaging/functional study to assess kidney after ureteral rest with stent out x 1 week (lasix MAG3 renal scan and CT urogram). If obstruction noted, ideally percutaneous nephrostomy would be used for drainage to allow continued ureteral rest in preparation for possible pyeloplasty.    - Emergent/urgent pyeloplasty is not indicated. This is an outpatient procedure.    - Toradal PRN may be helpful for flank pain    Okay to discharge home from  standpoint.          VTE Risk Mitigation (From admission, onward)         Ordered     Place sequential compression device  Until discontinued         06/30/22 2110     IP VTE HIGH RISK PATIENT  Once         06/30/22 2110     Place sequential compression device  Until discontinued         06/30/22 2110     Reason for No Pharmacological VTE Prophylaxis  Once        Question:  Reasons:  Answer:  Risk of Bleeding    06/30/22 2110                Sophie Mendoza MD  Urology  [facility] ([facility])

## 2022-07-02 NOTE — ASSESSMENT & PLAN NOTE
"Patient with chronic congenital UPJ obstruction and left ureteral double-J stent in place presented to [facility] with severe left flank pain.  She is on empiric antibiotics for pyelonephritis given UA findings, although it seems pain was due to inflammation involving the stent.  Stent was due for removal and was taken out by urology, left out for a period of "ureteral rest."  Continue ceftriaxone while awaiting cultures.  Blood cultures NGTD.  Urine culture also NGTD today.  Flank pain likely due to the obstructed stent with encrusted stones and its removal.  Urologist recommends toradol, will order 30 mg every 6 hours around the clock plus Levsin for spasms as needed.  She still has Dilaudid ordered and oxycodone, will use these as needed.  "

## 2022-07-02 NOTE — SUBJECTIVE & OBJECTIVE
"Interval History: Reports intermittent "spasms" in L side with L flank pain. She is upset that she has pain in her side after stent removal.     Review of Systems   Constitutional:  Negative for chills and fever.   HENT:  Negative for sore throat and trouble swallowing.    Eyes:  Negative for pain and discharge.   Respiratory:  Negative for shortness of breath and wheezing.    Cardiovascular:  Negative for chest pain and palpitations.   Gastrointestinal:  Negative for abdominal pain, diarrhea, nausea and vomiting.   Genitourinary:  Positive for flank pain.        As per HPI   Musculoskeletal:  Negative for back pain and joint swelling.   Skin:  Negative for rash and wound.   Neurological:  Negative for seizures, weakness and headaches.   Psychiatric/Behavioral:  Negative for hallucinations. The patient is not nervous/anxious.    Objective:     Temp:  [97.4 °F (36.3 °C)-98.6 °F (37 °C)] 98 °F (36.7 °C)  Pulse:  [75-95] 95  Resp:  [16-22] 20  SpO2:  [93 %-100 %] 96 %  BP: (121-178)/(66-98) 128/66     Body mass index is 48.28 kg/m².    Date 07/02/22 0700 - 07/03/22 0659   Shift 9439-1929 9496-6148 4879-2318 24 Hour Total   INTAKE   Shift Total(mL/kg)       OUTPUT   Urine(mL/kg/hr) 700   700   Shift Total(mL/kg) 700(4.9)   700(4.9)   Weight (kg) 144 144 144 144          Drains       None                   Physical Exam  Vitals and nursing note reviewed.   Constitutional:       General: She is not in acute distress.  HENT:      Head: Atraumatic.      Nose: Nose normal.   Eyes:      Extraocular Movements: Extraocular movements intact.   Cardiovascular:      Rate and Rhythm: Normal rate.   Pulmonary:      Effort: Pulmonary effort is normal.   Abdominal:      Tenderness: There is no abdominal tenderness. There is left CVA tenderness. There is no right CVA tenderness.      Comments: obese   Musculoskeletal:         General: Normal range of motion.      Cervical back: Normal range of motion.   Neurological:      General: No " focal deficit present.      Mental Status: She is alert. Mental status is at baseline.   Psychiatric:         Mood and Affect: Mood normal.         Behavior: Behavior normal.         Thought Content: Thought content normal.         Judgment: Judgment normal.       Significant Labs:    BMP:  Recent Labs   Lab 06/30/22 1831 07/01/22 0454 07/02/22  0354    141 137   K 3.6 4.0 4.3    105 102   CO2 25 26 24   BUN 14 13 12   CREATININE 0.8 0.8 0.9   CALCIUM 8.8 8.7 9.0       CBC:   Recent Labs   Lab 06/30/22 1831 07/01/22 0454 07/02/22  0354   WBC 6.74 5.20 10.35   HGB 10.4* 10.4* 10.7*   HCT 31.6* 32.2* 32.1*    190 248       Blood Culture: No results for input(s): LABBLOO in the last 168 hours.  Urine Culture:   Recent Labs   Lab 06/30/22  1709   LABURIN No significant growth     Urine Studies:   Recent Labs   Lab 06/30/22  1709   COLORU Yellow   APPEARANCEUA Clear   PHUR 6.0   SPECGRAV 1.015   PROTEINUA Trace*   GLUCUA Negative   KETONESU Negative   BILIRUBINUA Negative   OCCULTUA 3+*   NITRITE Positive*   UROBILINOGEN Negative   LEUKOCYTESUR 3+*   RBCUA 75*   WBCUA 50*   BACTERIA Many*       Significant Imaging:  All pertinent imaging results/findings from the past 24 hours have been reviewed.

## 2022-07-02 NOTE — NURSING
"0322  Notified by  that pt cussed at phlebotamist when she walked into the room.  Pt states "why did you come in here and turn the fucking light on without fucking warning me".  Labs were not drawn due to pt refusal. Maxi Mendieta NP notified. Steffi Dallas        "

## 2022-07-03 LAB
ALBUMIN SERPL BCP-MCNC: 2.8 G/DL (ref 3.5–5.2)
ALP SERPL-CCNC: 103 U/L (ref 55–135)
ALT SERPL W/O P-5'-P-CCNC: 16 U/L (ref 10–44)
ANION GAP SERPL CALC-SCNC: 12 MMOL/L (ref 8–16)
AST SERPL-CCNC: 14 U/L (ref 10–40)
BASOPHILS # BLD AUTO: 0.04 K/UL (ref 0–0.2)
BASOPHILS NFR BLD: 0.4 % (ref 0–1.9)
BILIRUB SERPL-MCNC: 0.2 MG/DL (ref 0.1–1)
BUN SERPL-MCNC: 18 MG/DL (ref 6–20)
CALCIUM SERPL-MCNC: 8.6 MG/DL (ref 8.7–10.5)
CHLORIDE SERPL-SCNC: 104 MMOL/L (ref 95–110)
CO2 SERPL-SCNC: 21 MMOL/L (ref 23–29)
CREAT SERPL-MCNC: 1 MG/DL (ref 0.5–1.4)
DIFFERENTIAL METHOD: ABNORMAL
EOSINOPHIL # BLD AUTO: 0.2 K/UL (ref 0–0.5)
EOSINOPHIL NFR BLD: 1.7 % (ref 0–8)
ERYTHROCYTE [DISTWIDTH] IN BLOOD BY AUTOMATED COUNT: 14.7 % (ref 11.5–14.5)
EST. GFR  (AFRICAN AMERICAN): >60 ML/MIN/1.73 M^2
EST. GFR  (NON AFRICAN AMERICAN): >60 ML/MIN/1.73 M^2
GLUCOSE SERPL-MCNC: 129 MG/DL (ref 70–110)
HCT VFR BLD AUTO: 32.7 % (ref 37–48.5)
HGB BLD-MCNC: 10.7 G/DL (ref 12–16)
IMM GRANULOCYTES # BLD AUTO: 0.12 K/UL (ref 0–0.04)
IMM GRANULOCYTES NFR BLD AUTO: 1.1 % (ref 0–0.5)
LYMPHOCYTES # BLD AUTO: 1.7 K/UL (ref 1–4.8)
LYMPHOCYTES NFR BLD: 16 % (ref 18–48)
MAGNESIUM SERPL-MCNC: 1.7 MG/DL (ref 1.6–2.6)
MCH RBC QN AUTO: 27.6 PG (ref 27–31)
MCHC RBC AUTO-ENTMCNC: 32.7 G/DL (ref 32–36)
MCV RBC AUTO: 84 FL (ref 82–98)
MONOCYTES # BLD AUTO: 0.7 K/UL (ref 0.3–1)
MONOCYTES NFR BLD: 6.3 % (ref 4–15)
NEUTROPHILS # BLD AUTO: 8 K/UL (ref 1.8–7.7)
NEUTROPHILS NFR BLD: 74.5 % (ref 38–73)
NRBC BLD-RTO: 0 /100 WBC
PHOSPHATE SERPL-MCNC: 4 MG/DL (ref 2.7–4.5)
PLATELET # BLD AUTO: 241 K/UL (ref 150–450)
PMV BLD AUTO: 9.5 FL (ref 9.2–12.9)
POTASSIUM SERPL-SCNC: 4.5 MMOL/L (ref 3.5–5.1)
PROT SERPL-MCNC: 6.3 G/DL (ref 6–8.4)
RBC # BLD AUTO: 3.88 M/UL (ref 4–5.4)
SODIUM SERPL-SCNC: 137 MMOL/L (ref 136–145)
WBC # BLD AUTO: 10.78 K/UL (ref 3.9–12.7)

## 2022-07-03 PROCEDURE — 63600175 PHARM REV CODE 636 W HCPCS: Performed by: HOSPITALIST

## 2022-07-03 PROCEDURE — 99232 PR SUBSEQUENT HOSPITAL CARE,LEVL II: ICD-10-PCS | Mod: ,,, | Performed by: HOSPITALIST

## 2022-07-03 PROCEDURE — 36415 COLL VENOUS BLD VENIPUNCTURE: CPT | Performed by: HOSPITALIST

## 2022-07-03 PROCEDURE — 25000003 PHARM REV CODE 250: Performed by: UROLOGY

## 2022-07-03 PROCEDURE — 99233 PR SUBSEQUENT HOSPITAL CARE,LEVL III: ICD-10-PCS | Mod: ,,, | Performed by: UROLOGY

## 2022-07-03 PROCEDURE — 25000003 PHARM REV CODE 250: Performed by: HOSPITALIST

## 2022-07-03 PROCEDURE — 63600175 PHARM REV CODE 636 W HCPCS: Performed by: NURSE PRACTITIONER

## 2022-07-03 PROCEDURE — 25000003 PHARM REV CODE 250: Performed by: ANESTHESIOLOGY

## 2022-07-03 PROCEDURE — 36415 COLL VENOUS BLD VENIPUNCTURE: CPT | Performed by: NURSE PRACTITIONER

## 2022-07-03 PROCEDURE — 94761 N-INVAS EAR/PLS OXIMETRY MLT: CPT

## 2022-07-03 PROCEDURE — 83735 ASSAY OF MAGNESIUM: CPT | Performed by: NURSE PRACTITIONER

## 2022-07-03 PROCEDURE — 11000001 HC ACUTE MED/SURG PRIVATE ROOM

## 2022-07-03 PROCEDURE — 99232 SBSQ HOSP IP/OBS MODERATE 35: CPT | Mod: ,,, | Performed by: HOSPITALIST

## 2022-07-03 PROCEDURE — 85025 COMPLETE CBC W/AUTO DIFF WBC: CPT | Performed by: HOSPITALIST

## 2022-07-03 PROCEDURE — 25000003 PHARM REV CODE 250: Performed by: NURSE PRACTITIONER

## 2022-07-03 PROCEDURE — 99233 SBSQ HOSP IP/OBS HIGH 50: CPT | Mod: ,,, | Performed by: UROLOGY

## 2022-07-03 PROCEDURE — 80053 COMPREHEN METABOLIC PANEL: CPT | Performed by: NURSE PRACTITIONER

## 2022-07-03 PROCEDURE — 84100 ASSAY OF PHOSPHORUS: CPT | Performed by: NURSE PRACTITIONER

## 2022-07-03 RX ORDER — PHENAZOPYRIDINE HYDROCHLORIDE 100 MG/1
200 TABLET, FILM COATED ORAL
Status: DISCONTINUED | OUTPATIENT
Start: 2022-07-03 | End: 2022-07-04 | Stop reason: HOSPADM

## 2022-07-03 RX ORDER — HYDRALAZINE HYDROCHLORIDE 20 MG/ML
10 INJECTION INTRAMUSCULAR; INTRAVENOUS ONCE
Status: COMPLETED | OUTPATIENT
Start: 2022-07-03 | End: 2022-07-03

## 2022-07-03 RX ORDER — DOCUSATE SODIUM 100 MG/1
100 CAPSULE, LIQUID FILLED ORAL 2 TIMES DAILY
Status: DISCONTINUED | OUTPATIENT
Start: 2022-07-03 | End: 2022-07-04 | Stop reason: HOSPADM

## 2022-07-03 RX ADMIN — OXYCODONE 5 MG: 5 TABLET ORAL at 05:07

## 2022-07-03 RX ADMIN — OXYCODONE 5 MG: 5 TABLET ORAL at 08:07

## 2022-07-03 RX ADMIN — KETOROLAC TROMETHAMINE 30 MG: 30 INJECTION, SOLUTION INTRAMUSCULAR; INTRAVENOUS at 11:07

## 2022-07-03 RX ADMIN — HYDRALAZINE HYDROCHLORIDE 10 MG: 20 INJECTION INTRAMUSCULAR; INTRAVENOUS at 04:07

## 2022-07-03 RX ADMIN — HYDROMORPHONE HYDROCHLORIDE 0.5 MG: 1 INJECTION, SOLUTION INTRAMUSCULAR; INTRAVENOUS; SUBCUTANEOUS at 12:07

## 2022-07-03 RX ADMIN — HYDROMORPHONE HYDROCHLORIDE 0.5 MG: 1 INJECTION, SOLUTION INTRAMUSCULAR; INTRAVENOUS; SUBCUTANEOUS at 07:07

## 2022-07-03 RX ADMIN — HYOSCYAMINE SULFATE 0.12 MG: 0.12 TABLET ORAL; SUBLINGUAL at 08:07

## 2022-07-03 RX ADMIN — HYDROMORPHONE HYDROCHLORIDE 0.5 MG: 1 INJECTION, SOLUTION INTRAMUSCULAR; INTRAVENOUS; SUBCUTANEOUS at 05:07

## 2022-07-03 RX ADMIN — KETOROLAC TROMETHAMINE 30 MG: 30 INJECTION, SOLUTION INTRAMUSCULAR; INTRAVENOUS at 12:07

## 2022-07-03 RX ADMIN — KETOROLAC TROMETHAMINE 30 MG: 30 INJECTION, SOLUTION INTRAMUSCULAR; INTRAVENOUS at 05:07

## 2022-07-03 RX ADMIN — OXYCODONE 5 MG: 5 TABLET ORAL at 04:07

## 2022-07-03 RX ADMIN — CYCLOBENZAPRINE HYDROCHLORIDE 200 MG: 5 TABLET, FILM COATED ORAL at 11:07

## 2022-07-03 RX ADMIN — HYOSCYAMINE SULFATE 0.12 MG: 0.12 TABLET ORAL; SUBLINGUAL at 02:07

## 2022-07-03 RX ADMIN — DOCUSATE SODIUM 100 MG: 100 CAPSULE, LIQUID FILLED ORAL at 08:07

## 2022-07-03 RX ADMIN — HYDROMORPHONE HYDROCHLORIDE 0.5 MG: 1 INJECTION, SOLUTION INTRAMUSCULAR; INTRAVENOUS; SUBCUTANEOUS at 02:07

## 2022-07-03 RX ADMIN — CYCLOBENZAPRINE HYDROCHLORIDE 200 MG: 5 TABLET, FILM COATED ORAL at 05:07

## 2022-07-03 RX ADMIN — BACLOFEN 5 MG: 5 TABLET ORAL at 08:07

## 2022-07-03 NOTE — PROGRESS NOTES
"[facility] Medicine  Progress Note    Patient Name: Pema Norris  MRN: [id number]  Patient Class: IP- Inpatient   Admission Date: 6/30/2022  Length of Stay: 2 days  Attending Physician: Annel Gomez MD  Primary Care Provider: Srevando Blackburn MD        Subjective:     Principal Problem:Acute pyelonephritis        HPI:  From H&P by Jorge Mendieta NP:  "The patient is a 36 y.o. female who presents with complaint of left flank pain onset last night. The patient reports going to [facility] yesterday for her pain, which she describes as severe and "worse than having babies".  Her  notes she had a CT scan of her abdomen and was told she needed surgery to reconnect her ureter to her kidney. She describes being told there was no urologist available at that facility to perform the procedure. The patient was then intended to transfer to [facility] via EMS, but opted not to as her  was unable to ride with her. She reports having a fever of 101.3°F last night, which has since resolved. She describes currently feeling nauseas and light-headed. Of note, the patient has had two ureteral stents placed in her left kidney. She states her most recent stent was placed in February. The patient's urologist is Dr. Osorio.  The patient will be admitted for further management of her left sided hydronephrosis and Urology consult."    Review of transfer center note from the canceled transfer includes CT reading which states there was severe left sided hydronephrosis with edematous changes around the left kidney and double J ureteral stent.      Overview/Hospital Course:  Patient with a congenital UPJ obstruction was admitted for urology consultation.  She was continued on IV ceftriaxone empirically for pyelonephritis while awaiting cultures.  She was seen by urology and taken for cystoscopy on 7/1 with left retrograde pyelogram, stone removal and left ureteral JJ stent removal.  " Alligator graspers were inserted via the cystoscope and used to fragment stones/incrustations seen along the ureteral stent's external coil, and then the stent was removed.  Stone fragments were sent for analysis.  Patient was returned to her room and her diet was advanced.  IV antibiotics were continued while awaiting cultures.  She was afebrile while she was here and had a normal WBC.  Culture from 6/30 showed no growth, antibiotics discontinued.  She had severe pain overnight after the procedure and remained in the hospital for pain control.      Interval History:  Toradol is helping but she still has pain and does not feel ready to be discharged.  Boyfriend asleep next to her.    Review of Systems   Constitutional:  Negative for chills and fever.   Respiratory:  Negative for cough and shortness of breath.    Cardiovascular:  Negative for chest pain and palpitations.   Genitourinary:  Positive for flank pain.   Objective:     Vital Signs (Most Recent):  Temp: 98.4 °F (36.9 °C) (07/03/22 1147)  Pulse: 86 (07/03/22 1147)  Resp: 18 (07/03/22 1445)  BP: (!) 152/72 (07/03/22 1147)  SpO2: 95 % (07/03/22 1147)   Vital Signs (24h Range):  Temp:  [98 °F (36.7 °C)-98.4 °F (36.9 °C)] 98.4 °F (36.9 °C)  Pulse:  [79-91] 86  Resp:  [18-23] 18  SpO2:  [95 %-99 %] 95 %  BP: (137-184)/() 152/72     Weight: (!) 144 kg (317 lb 8 oz)  Body mass index is 48.28 kg/m².    Intake/Output Summary (Last 24 hours) at 7/3/2022 1641  Last data filed at 7/2/2022 1945  Gross per 24 hour   Intake 3188.16 ml   Output 100 ml   Net 3088.16 ml      Physical Exam  Constitutional:       General: She is not in acute distress.     Appearance: She is obese.   Cardiovascular:      Rate and Rhythm: Normal rate and regular rhythm.      Heart sounds: No murmur heard.    No gallop.   Pulmonary:      Effort: Pulmonary effort is normal.      Breath sounds: Normal breath sounds.   Abdominal:      Comments: Left flank tenderness on palpation.   Skin:      "General: Skin is warm and dry.   Neurological:      General: No focal deficit present.      Mental Status: She is alert and oriented to person, place, and time.   Psychiatric:         Mood and Affect: Mood normal.         Behavior: Behavior normal.       Significant Labs: All pertinent labs within the past 24 hours have been reviewed.    Significant Imaging: I have reviewed all pertinent imaging results/findings within the past 24 hours.      Assessment/Plan:      * Acute pyelonephritis  Patient with chronic congenital UPJ obstruction and left ureteral double-J stent in place presented to [facility] with severe left flank pain.  She is on empiric antibiotics for pyelonephritis given UA findings, although it seems pain was due to inflammation involving the stent.  Stent was due for removal and was taken out by urology, left out for a period of "ureteral rest."  Continue ceftriaxone while awaiting cultures.  Blood cultures NGTD.  Urine culture also NGTD today.  Flank pain likely due to the obstructed stent with encrusted stones and its removal.  Urologist recommends toradol, will order 30 mg every 6 hours around the clock plus Levsin for spasms as needed.  She still has Dilaudid ordered and oxycodone, will use these as needed.    Morbid obesity with BMI of 45.0-49.9, adult  Noted      UPJ obstruction, congenital  Patient with double J stent in place.  Urology consulted and performed cystoscopy with left retrograde pyelogram, stone removal and left ureteral JJ stent removal.  Dr. Welch noted patency of the UPJ and will leave the stent out for now.  Follow up in one week for Lasix renal scan and CT urogram.  Depending on results pyeloplasty might be needed.      VTE Risk Mitigation (From admission, onward)         Ordered     Place sequential compression device  Until discontinued         06/30/22 2110     IP VTE HIGH RISK PATIENT  Once         06/30/22 2110     Place sequential compression device  Until " discontinued         06/30/22 2110     Reason for No Pharmacological VTE Prophylaxis  Once        Question:  Reasons:  Answer:  Risk of Bleeding    06/30/22 2110                        Annel Guerra MD  Department of Hospital Medicine   [facility] ([place])

## 2022-07-03 NOTE — ASSESSMENT & PLAN NOTE
Pema Norris is a 36 y.o. female with known left UPJ obstruction, ureteral stent in place, presenting for worsening left flank pain and possible pyelonephritis.     - Cr normal   - L ureteral stent removed 7/1/22 by Dr Welch. No obstruction noted on RPG therefore stent not replaced.   - Recommended for imaging/functional study to assess kidney after ureteral rest with stent out x 1 week (lasix MAG3 renal scan and CT urogram). If obstruction noted, ideally percutaneous nephrostomy would be used for drainage to allow continued ureteral rest in preparation for possible pyeloplasty.    - Emergent/urgent pyeloplasty is not indicated. This is an outpatient procedure.    - Toradal scheduled started yesterday, Levsin PRN.    - Add Pyridium for dysuria. UCx negative.     Okay to discharge home from  standpoint.

## 2022-07-03 NOTE — SUBJECTIVE & OBJECTIVE
Interval History: Sleeping on exam. Upon awakening, she reports pain is better controlled but remains present. Also with new/recurrent dysuria. Recent UCx x 2 negative.     Review of Systems   Constitutional:  Negative for chills and fever.   HENT:  Negative for sore throat and trouble swallowing.    Eyes:  Negative for pain and discharge.   Respiratory:  Negative for shortness of breath and wheezing.    Cardiovascular:  Negative for chest pain and palpitations.   Gastrointestinal:  Negative for abdominal pain, diarrhea, nausea and vomiting.   Genitourinary:  Positive for dysuria and flank pain.        As per HPI   Musculoskeletal:  Negative for back pain and joint swelling.   Skin:  Negative for rash and wound.   Neurological:  Negative for seizures, weakness and headaches.   Psychiatric/Behavioral:  Negative for hallucinations. The patient is not nervous/anxious.    Objective:     Temp:  [98 °F (36.7 °C)-98.3 °F (36.8 °C)] 98.3 °F (36.8 °C)  Pulse:  [79-95] 88  Resp:  [8-23] 18  SpO2:  [94 %-99 %] 97 %  BP: (128-184)/() 137/73     Body mass index is 48.28 kg/m².           Drains       None                   Physical Exam  Vitals and nursing note reviewed.   Constitutional:       General: She is not in acute distress.  HENT:      Head: Atraumatic.      Nose: Nose normal.   Eyes:      Extraocular Movements: Extraocular movements intact.   Cardiovascular:      Rate and Rhythm: Normal rate.   Pulmonary:      Effort: Pulmonary effort is normal.   Abdominal:      Tenderness: There is no abdominal tenderness. There is left CVA tenderness. There is no right CVA tenderness.      Comments: obese   Musculoskeletal:         General: Normal range of motion.      Cervical back: Normal range of motion.   Neurological:      General: No focal deficit present.      Mental Status: She is alert. Mental status is at baseline.   Psychiatric:         Mood and Affect: Mood normal.         Behavior: Behavior normal.         Thought  Content: Thought content normal.         Judgment: Judgment normal.       Significant Labs:    BMP:  Recent Labs   Lab 07/01/22  0454 07/02/22  0354 07/03/22  0358    137 137   K 4.0 4.3 4.5    102 104   CO2 26 24 21*   BUN 13 12 18   CREATININE 0.8 0.9 1.0   CALCIUM 8.7 9.0 8.6*       CBC:   Recent Labs   Lab 07/01/22  0454 07/02/22  0354 07/03/22  0805   WBC 5.20 10.35 10.78   HGB 10.4* 10.7* 10.7*   HCT 32.2* 32.1* 32.7*    248 241       Blood Culture: No results for input(s): LABBLOO in the last 168 hours.  Urine Culture:   Recent Labs   Lab 06/30/22  1709 07/01/22  1338   LABURIN No significant growth No growth     Urine Studies:   Recent Labs   Lab 06/30/22  1709   COLORU Yellow   APPEARANCEUA Clear   PHUR 6.0   SPECGRAV 1.015   PROTEINUA Trace*   GLUCUA Negative   KETONESU Negative   BILIRUBINUA Negative   OCCULTUA 3+*   NITRITE Positive*   UROBILINOGEN Negative   LEUKOCYTESUR 3+*   RBCUA 75*   WBCUA 50*   BACTERIA Many*       Significant Imaging:  All pertinent imaging results/findings from the past 24 hours have been reviewed.

## 2022-07-03 NOTE — PROGRESS NOTES
[facility])  Urology  Progress Note    Patient Name: Pema Norris  MRN: [id number]  Admission Date: 6/30/2022  Hospital Length of Stay: 2 days  Code Status: Full Code   Attending Provider: Annel Gomez MD   Primary Care Physician: Servando Blackburn MD    Subjective:     HPI:  Pema Norris is a 36 y.o. female who presents from [facility] for severe left flank pain and fevers/chills with known history of left UPJ obstructions, left ureteral stent in place, last exchanged in February of 2022. She is an established urology patient of Dr. Osorio. He diagnosed her with left UPJ obstruction with crossing vessels back in August of 2021 with Mag3 scan showing delayed left nephrogram with functionality of 62% and 37% of the right and left kidney, respectively. A recommendation was made to have a possible left pyeloplasty, but as of now, no surgeries have been conducted or are planned. At bedside, she is AFVSS with pain well controlled. Cr 0.8 (baseline), WBC 5.2, hgb 10.4. UA with 3+OB, 3+ leuks, and nitrite positive. Micro-UA with 75RBC, 50WBC and many bacteria. Ucx pending. She remains on CTX.      Interval History: Sleeping on exam. Upon awakening, she reports pain is better controlled but remains present. Also with new/recurrent dysuria. Recent UCx x 2 negative.     Review of Systems   Constitutional:  Negative for chills and fever.   HENT:  Negative for sore throat and trouble swallowing.    Eyes:  Negative for pain and discharge.   Respiratory:  Negative for shortness of breath and wheezing.    Cardiovascular:  Negative for chest pain and palpitations.   Gastrointestinal:  Negative for abdominal pain, diarrhea, nausea and vomiting.   Genitourinary:  Positive for dysuria and flank pain.        As per HPI   Musculoskeletal:  Negative for back pain and joint swelling.   Skin:  Negative for rash and wound.   Neurological:  Negative for seizures, weakness and headaches.    Psychiatric/Behavioral:  Negative for hallucinations. The patient is not nervous/anxious.    Objective:     Temp:  [98 °F (36.7 °C)-98.3 °F (36.8 °C)] 98.3 °F (36.8 °C)  Pulse:  [79-95] 88  Resp:  [8-23] 18  SpO2:  [94 %-99 %] 97 %  BP: (128-184)/() 137/73     Body mass index is 48.28 kg/m².           Drains       None                   Physical Exam  Vitals and nursing note reviewed.   Constitutional:       General: She is not in acute distress.  HENT:      Head: Atraumatic.      Nose: Nose normal.   Eyes:      Extraocular Movements: Extraocular movements intact.   Cardiovascular:      Rate and Rhythm: Normal rate.   Pulmonary:      Effort: Pulmonary effort is normal.   Abdominal:      Tenderness: There is no abdominal tenderness. There is left CVA tenderness. There is no right CVA tenderness.      Comments: obese   Musculoskeletal:         General: Normal range of motion.      Cervical back: Normal range of motion.   Neurological:      General: No focal deficit present.      Mental Status: She is alert. Mental status is at baseline.   Psychiatric:         Mood and Affect: Mood normal.         Behavior: Behavior normal.         Thought Content: Thought content normal.         Judgment: Judgment normal.       Significant Labs:    BMP:  Recent Labs   Lab 07/01/22  0454 07/02/22  0354 07/03/22  0358    137 137   K 4.0 4.3 4.5    102 104   CO2 26 24 21*   BUN 13 12 18   CREATININE 0.8 0.9 1.0   CALCIUM 8.7 9.0 8.6*       CBC:   Recent Labs   Lab 07/01/22  0454 07/02/22  0354 07/03/22  0805   WBC 5.20 10.35 10.78   HGB 10.4* 10.7* 10.7*   HCT 32.2* 32.1* 32.7*    248 241       Blood Culture: No results for input(s): LABBLOO in the last 168 hours.  Urine Culture:   Recent Labs   Lab 06/30/22  1709 07/01/22  1338   LABURIN No significant growth No growth     Urine Studies:   Recent Labs   Lab 06/30/22  1709   COLORU Yellow   APPEARANCEUA Clear   PHUR 6.0   SPECGRAV 1.015   PROTEINUA Trace*    GLUCUA Negative   KETONESU Negative   BILIRUBINUA Negative   OCCULTUA 3+*   NITRITE Positive*   UROBILINOGEN Negative   LEUKOCYTESUR 3+*   RBCUA 75*   WBCUA 50*   BACTERIA Many*       Significant Imaging:  All pertinent imaging results/findings from the past 24 hours have been reviewed.                    Assessment/Plan:     * Acute pyelonephritis   - Recent UCx negative (2 days ago)   - UCx this admission negative    UPJ obstruction, congenital  Pema Norris is a 36 y.o. female with known left UPJ obstruction, ureteral stent in place, presenting for worsening left flank pain and possible pyelonephritis.     - Cr normal   - L ureteral stent removed 7/1/22 by Dr Welch. No obstruction noted on RPG therefore stent not replaced.   - Recommended for imaging/functional study to assess kidney after ureteral rest with stent out x 1 week (lasix MAG3 renal scan and CT urogram). If obstruction noted, ideally percutaneous nephrostomy would be used for drainage to allow continued ureteral rest in preparation for possible pyeloplasty.    - Emergent/urgent pyeloplasty is not indicated. This is an outpatient procedure.    - Toradal scheduled started yesterday, Levsin PRN.    - Add Pyridium for dysuria. UCx negative.     Okay to discharge home from  standpoint.          VTE Risk Mitigation (From admission, onward)         Ordered     Place sequential compression device  Until discontinued         06/30/22 2110     IP VTE HIGH RISK PATIENT  Once         06/30/22 2110     Place sequential compression device  Until discontinued         06/30/22 2110     Reason for No Pharmacological VTE Prophylaxis  Once        Question:  Reasons:  Answer:  Risk of Bleeding    06/30/22 2110                Sophie Mendoza MD  Urology  [facility] ([place])

## 2022-07-03 NOTE — SUBJECTIVE & OBJECTIVE
Interval History:  Toradol is helping but she still has pain and does not feel ready to be discharged.  Boyfriend asleep next to her.    Review of Systems   Constitutional:  Negative for chills and fever.   Respiratory:  Negative for cough and shortness of breath.    Cardiovascular:  Negative for chest pain and palpitations.   Genitourinary:  Positive for flank pain.   Objective:     Vital Signs (Most Recent):  Temp: 98.4 °F (36.9 °C) (07/03/22 1147)  Pulse: 86 (07/03/22 1147)  Resp: 18 (07/03/22 1445)  BP: (!) 152/72 (07/03/22 1147)  SpO2: 95 % (07/03/22 1147)   Vital Signs (24h Range):  Temp:  [98 °F (36.7 °C)-98.4 °F (36.9 °C)] 98.4 °F (36.9 °C)  Pulse:  [79-91] 86  Resp:  [18-23] 18  SpO2:  [95 %-99 %] 95 %  BP: (137-184)/() 152/72     Weight: (!) 144 kg (317 lb 8 oz)  Body mass index is 48.28 kg/m².    Intake/Output Summary (Last 24 hours) at 7/3/2022 1641  Last data filed at 7/2/2022 1945  Gross per 24 hour   Intake 3188.16 ml   Output 100 ml   Net 3088.16 ml      Physical Exam  Constitutional:       General: She is not in acute distress.     Appearance: She is obese.   Cardiovascular:      Rate and Rhythm: Normal rate and regular rhythm.      Heart sounds: No murmur heard.    No gallop.   Pulmonary:      Effort: Pulmonary effort is normal.      Breath sounds: Normal breath sounds.   Abdominal:      Comments: Left flank tenderness on palpation.   Skin:     General: Skin is warm and dry.   Neurological:      General: No focal deficit present.      Mental Status: She is alert and oriented to person, place, and time.   Psychiatric:         Mood and Affect: Mood normal.         Behavior: Behavior normal.       Significant Labs: All pertinent labs within the past 24 hours have been reviewed.    Significant Imaging: I have reviewed all pertinent imaging results/findings within the past 24 hours.

## 2022-07-04 ENCOUNTER — NURSE TRIAGE (OUTPATIENT)
Dept: ADMINISTRATIVE | Facility: CLINIC | Age: 37
End: 2022-07-04

## 2022-07-04 PROBLEM — R10.9 ACUTE LEFT FLANK PAIN: Status: ACTIVE | Noted: 2022-06-30

## 2022-07-04 PROBLEM — N20.1 CALCULUS OF URETER: Status: ACTIVE | Noted: 2022-07-04

## 2022-07-04 PROBLEM — T83.84XA PAIN DUE TO URETERAL STENT: Status: ACTIVE | Noted: 2022-07-04

## 2022-07-04 PROCEDURE — 99233 PR SUBSEQUENT HOSPITAL CARE,LEVL III: ICD-10-PCS | Mod: ,,, | Performed by: UROLOGY

## 2022-07-04 PROCEDURE — 63600175 PHARM REV CODE 636 W HCPCS: Performed by: HOSPITALIST

## 2022-07-04 PROCEDURE — 25000003 PHARM REV CODE 250: Performed by: ANESTHESIOLOGY

## 2022-07-04 PROCEDURE — 25000003 PHARM REV CODE 250: Performed by: UROLOGY

## 2022-07-04 PROCEDURE — 25000003 PHARM REV CODE 250: Performed by: HOSPITALIST

## 2022-07-04 PROCEDURE — 63600175 PHARM REV CODE 636 W HCPCS: Performed by: NURSE PRACTITIONER

## 2022-07-04 PROCEDURE — 94761 N-INVAS EAR/PLS OXIMETRY MLT: CPT

## 2022-07-04 PROCEDURE — 99233 SBSQ HOSP IP/OBS HIGH 50: CPT | Mod: ,,, | Performed by: UROLOGY

## 2022-07-04 PROCEDURE — 99238 PR HOSPITAL DISCHARGE DAY,<30 MIN: ICD-10-PCS | Mod: ,,, | Performed by: HOSPITALIST

## 2022-07-04 PROCEDURE — 99238 HOSP IP/OBS DSCHRG MGMT 30/<: CPT | Mod: ,,, | Performed by: HOSPITALIST

## 2022-07-04 PROCEDURE — 25000003 PHARM REV CODE 250: Performed by: NURSE PRACTITIONER

## 2022-07-04 RX ORDER — PHENAZOPYRIDINE HYDROCHLORIDE 200 MG/1
200 TABLET, FILM COATED ORAL
Qty: 30 TABLET | Refills: 0 | Status: SHIPPED | OUTPATIENT
Start: 2022-07-04 | End: 2022-07-14

## 2022-07-04 RX ORDER — OXYCODONE AND ACETAMINOPHEN 7.5; 325 MG/1; MG/1
1 TABLET ORAL EVERY 6 HOURS PRN
Qty: 28 TABLET | Refills: 0 | Status: SHIPPED | OUTPATIENT
Start: 2022-07-04

## 2022-07-04 RX ADMIN — HYDROMORPHONE HYDROCHLORIDE 0.5 MG: 1 INJECTION, SOLUTION INTRAMUSCULAR; INTRAVENOUS; SUBCUTANEOUS at 09:07

## 2022-07-04 RX ADMIN — CYCLOBENZAPRINE HYDROCHLORIDE 200 MG: 5 TABLET, FILM COATED ORAL at 09:07

## 2022-07-04 RX ADMIN — CYCLOBENZAPRINE HYDROCHLORIDE 200 MG: 5 TABLET, FILM COATED ORAL at 11:07

## 2022-07-04 RX ADMIN — KETOROLAC TROMETHAMINE 30 MG: 30 INJECTION, SOLUTION INTRAMUSCULAR; INTRAVENOUS at 11:07

## 2022-07-04 RX ADMIN — HYDROMORPHONE HYDROCHLORIDE 0.5 MG: 1 INJECTION, SOLUTION INTRAMUSCULAR; INTRAVENOUS; SUBCUTANEOUS at 01:07

## 2022-07-04 RX ADMIN — DOCUSATE SODIUM 100 MG: 100 CAPSULE, LIQUID FILLED ORAL at 09:07

## 2022-07-04 RX ADMIN — OXYCODONE 5 MG: 5 TABLET ORAL at 11:07

## 2022-07-04 RX ADMIN — HYOSCYAMINE SULFATE 0.12 MG: 0.12 TABLET ORAL; SUBLINGUAL at 09:07

## 2022-07-04 RX ADMIN — HYDROMORPHONE HYDROCHLORIDE 0.5 MG: 1 INJECTION, SOLUTION INTRAMUSCULAR; INTRAVENOUS; SUBCUTANEOUS at 02:07

## 2022-07-04 RX ADMIN — KETOROLAC TROMETHAMINE 30 MG: 30 INJECTION, SOLUTION INTRAMUSCULAR; INTRAVENOUS at 05:07

## 2022-07-04 NOTE — PROGRESS NOTES
"[facility] ([place])  Urology  Progress Note    Patient Name: Pema Norris  MRN: [id number]  Admission Date: 6/30/2022  Hospital Length of Stay: 3 days  Code Status: Full Code   Attending Provider: Annel Gomez MD   Primary Care Physician: Servando Blackburn MD    Subjective:     HPI:  Pema Norris is a 36 y.o. female who presents from [facility] for severe left flank pain and fevers/chills with known history of left UPJ obstructions, left ureteral stent in place, last exchanged in February of 2022. She is an established urology patient of Dr. Osorio. He diagnosed her with left UPJ obstruction with crossing vessels back in August of 2021 with Mag3 scan showing delayed left nephrogram with functionality of 62% and 37% of the right and left kidney, respectively. A recommendation was made to have a possible left pyeloplasty, but as of now, no surgeries have been conducted or are planned. At bedside, she is AFVSS with pain well controlled. Cr 0.8 (baseline), WBC 5.2, hgb 10.4. UA with 3+OB, 3+ leuks, and nitrite positive. Micro-UA with 75RBC, 50WBC and many bacteria. Ucx pending. She remains on CTX.      Interval History: Reports continued pain now with pain all over her body "like she is bruised." After discussing the plan with her, she told me that I did not know anything about her or her case and indicated for me to leave.     Review of Systems   Constitutional:  Negative for chills and fever.   HENT:  Negative for sore throat and trouble swallowing.    Eyes:  Negative for pain and discharge.   Respiratory:  Negative for shortness of breath and wheezing.    Cardiovascular:  Negative for chest pain and palpitations.   Gastrointestinal:  Negative for abdominal pain, diarrhea, nausea and vomiting.   Genitourinary:  Positive for dysuria and flank pain.        As per HPI   Musculoskeletal:  Negative for back pain and joint swelling.   Skin:  Negative for rash and wound.   Neurological:  Negative for " seizures, weakness and headaches.   Psychiatric/Behavioral:  Negative for hallucinations. The patient is not nervous/anxious.    Objective:     Temp:  [97.6 °F (36.4 °C)-99.8 °F (37.7 °C)] 97.6 °F (36.4 °C)  Pulse:  [84-98] 85  Resp:  [15-22] 16  SpO2:  [96 %-100 %] 98 %  BP: (142-166)/(68-91) 144/91     Body mass index is 48.28 kg/m².           Drains       None                   Physical Exam  Vitals and nursing note reviewed.   Constitutional:       General: She is not in acute distress.  HENT:      Head: Atraumatic.      Nose: Nose normal.   Eyes:      Extraocular Movements: Extraocular movements intact.   Cardiovascular:      Rate and Rhythm: Normal rate.   Pulmonary:      Effort: Pulmonary effort is normal.   Abdominal:      Tenderness: There is no abdominal tenderness. There is left CVA tenderness. There is no right CVA tenderness.      Comments: obese   Musculoskeletal:         General: Normal range of motion.      Cervical back: Normal range of motion.   Neurological:      General: No focal deficit present.      Mental Status: She is alert. Mental status is at baseline.   Psychiatric:      Comments: Aggressive behavior demonstrated       Significant Labs:    BMP:  Recent Labs   Lab 07/01/22  0454 07/02/22  0354 07/03/22  0358    137 137   K 4.0 4.3 4.5    102 104   CO2 26 24 21*   BUN 13 12 18   CREATININE 0.8 0.9 1.0   CALCIUM 8.7 9.0 8.6*       CBC:   Recent Labs   Lab 07/01/22  0454 07/02/22  0354 07/03/22  0805   WBC 5.20 10.35 10.78   HGB 10.4* 10.7* 10.7*   HCT 32.2* 32.1* 32.7*    248 241       Blood Culture: No results for input(s): LABBLOO in the last 168 hours.  Urine Culture:   Recent Labs   Lab 06/30/22  1709 07/01/22  1338   LABURIN No significant growth No growth     Urine Studies:   Recent Labs   Lab 06/30/22  1709   COLORU Yellow   APPEARANCEUA Clear   PHUR 6.0   SPECGRAV 1.015   PROTEINUA Trace*   GLUCUA Negative   KETONESU Negative   BILIRUBINUA Negative   OCCULTUA 3+*    NITRITE Positive*   UROBILINOGEN Negative   LEUKOCYTESUR 3+*   RBCUA 75*   WBCUA 50*   BACTERIA Many*       Significant Imaging:  All pertinent imaging results/findings from the past 24 hours have been reviewed.          Assessment/Plan:     * Acute left flank pain   - Recent UCx negative (2 days ago)   - Suspected pyelonephritis - UCx this admission negative    Hydronephrosis with ureteropelvic junction (UPJ) obstruction   - CT urogram and MAG3 renal scan +/- Lasix scheduled.     Pain due to ureteral stent   - Stent removed    UPJ obstruction, congenital  Pema Norris is a 36 y.o. female with known left UPJ obstruction, ureteral stent in place, presenting for worsening left flank pain and possible pyelonephritis.     - Cr normal   - L ureteral stent removed 7/1/22 by Dr Welch. No obstruction noted on RPG therefore stent not replaced.   - Recommended for imaging/functional study to assess kidney after ureteral rest with stent out x 1 week (lasix MAG3 renal scan and CT urogram). If obstruction noted, ideally percutaneous nephrostomy would be used for drainage to allow continued ureteral rest in preparation for possible pyeloplasty.    - Emergent/urgent pyeloplasty is not indicated. This is an outpatient procedure. Further imaging/functional eval necessary prior after ureteral rest.    - Toradal, Levsin for pain control.   - Pyridium for dysuria. UCx negative.     Okay to discharge home from  standpoint.          VTE Risk Mitigation (From admission, onward)         Ordered     Place sequential compression device  Until discontinued         06/30/22 2110     IP VTE HIGH RISK PATIENT  Once         06/30/22 2110     Place sequential compression device  Until discontinued         06/30/22 2110     Reason for No Pharmacological VTE Prophylaxis  Once        Question:  Reasons:  Answer:  Risk of Bleeding    06/30/22 2110                Sophie Mendoza MD  Urology  [facility] ([place])

## 2022-07-04 NOTE — PLAN OF CARE
Spoke with patient -  will provide transportation home - denied any discharge needs     *patient reports being active with [employer] but does not have insurance card with her*       07/04/22 0975   Final Note   Assessment Type Final Discharge Note   Anticipated Discharge Disposition Home   What phone number can be called within the next 1-3 days to see how you are doing after discharge? [phone]   Hospital Resources/Appts/Education Provided Provided patient/caregiver with written discharge plan information

## 2022-07-04 NOTE — SUBJECTIVE & OBJECTIVE
"Interval History: Reports continued pain now with pain all over her body "like she is bruised." After discussing the plan with her, she told me that I did not know anything about her or her case and indicated for me to leave.     Review of Systems   Constitutional:  Negative for chills and fever.   HENT:  Negative for sore throat and trouble swallowing.    Eyes:  Negative for pain and discharge.   Respiratory:  Negative for shortness of breath and wheezing.    Cardiovascular:  Negative for chest pain and palpitations.   Gastrointestinal:  Negative for abdominal pain, diarrhea, nausea and vomiting.   Genitourinary:  Positive for dysuria and flank pain.        As per HPI   Musculoskeletal:  Negative for back pain and joint swelling.   Skin:  Negative for rash and wound.   Neurological:  Negative for seizures, weakness and headaches.   Psychiatric/Behavioral:  Negative for hallucinations. The patient is not nervous/anxious.    Objective:     Temp:  [97.6 °F (36.4 °C)-99.8 °F (37.7 °C)] 97.6 °F (36.4 °C)  Pulse:  [84-98] 85  Resp:  [15-22] 16  SpO2:  [96 %-100 %] 98 %  BP: (142-166)/(68-91) 144/91     Body mass index is 48.28 kg/m².           Drains       None                   Physical Exam  Vitals and nursing note reviewed.   Constitutional:       General: She is not in acute distress.  HENT:      Head: Atraumatic.      Nose: Nose normal.   Eyes:      Extraocular Movements: Extraocular movements intact.   Cardiovascular:      Rate and Rhythm: Normal rate.   Pulmonary:      Effort: Pulmonary effort is normal.   Abdominal:      Tenderness: There is no abdominal tenderness. There is left CVA tenderness. There is no right CVA tenderness.      Comments: obese   Musculoskeletal:         General: Normal range of motion.      Cervical back: Normal range of motion.   Neurological:      General: No focal deficit present.      Mental Status: She is alert. Mental status is at baseline.   Psychiatric:      Comments: Aggressive " behavior demonstrated       Significant Labs:    BMP:  Recent Labs   Lab 07/01/22  0454 07/02/22  0354 07/03/22  0358    137 137   K 4.0 4.3 4.5    102 104   CO2 26 24 21*   BUN 13 12 18   CREATININE 0.8 0.9 1.0   CALCIUM 8.7 9.0 8.6*       CBC:   Recent Labs   Lab 07/01/22  0454 07/02/22  0354 07/03/22  0805   WBC 5.20 10.35 10.78   HGB 10.4* 10.7* 10.7*   HCT 32.2* 32.1* 32.7*    248 241       Blood Culture: No results for input(s): LABBLOO in the last 168 hours.  Urine Culture:   Recent Labs   Lab 06/30/22  1709 07/01/22  1338   LABURIN No significant growth No growth     Urine Studies:   Recent Labs   Lab 06/30/22  1709   COLORU Yellow   APPEARANCEUA Clear   PHUR 6.0   SPECGRAV 1.015   PROTEINUA Trace*   GLUCUA Negative   KETONESU Negative   BILIRUBINUA Negative   OCCULTUA 3+*   NITRITE Positive*   UROBILINOGEN Negative   LEUKOCYTESUR 3+*   RBCUA 75*   WBCUA 50*   BACTERIA Many*       Significant Imaging:  All pertinent imaging results/findings from the past 24 hours have been reviewed.

## 2022-07-04 NOTE — PLAN OF CARE
Pt AAOx4. No acute events this shift. Bed low and locked in place. Call bell and personal items in reach. PT discharged. IV removed. Discharge papers given and reviewed.      Problem: Adult Inpatient Plan of Care  Goal: Plan of Care Review  Outcome: Adequate for Care Transition  Goal: Patient-Specific Goal (Individualized)  Outcome: Adequate for Care Transition  Goal: Absence of Hospital-Acquired Illness or Injury  Outcome: Adequate for Care Transition  Goal: Optimal Comfort and Wellbeing  Outcome: Adequate for Care Transition  Goal: Readiness for Transition of Care  Outcome: Adequate for Care Transition     Problem: Bariatric Environmental Safety  Goal: Safety Maintained with Care  Outcome: Adequate for Care Transition     Problem: Fall Injury Risk  Goal: Absence of Fall and Fall-Related Injury  Outcome: Adequate for Care Transition

## 2022-07-04 NOTE — ASSESSMENT & PLAN NOTE
Pema Norris is a 36 y.o. female with known left UPJ obstruction, ureteral stent in place, presenting for worsening left flank pain and possible pyelonephritis.     - Cr normal   - L ureteral stent removed 7/1/22 by Dr Welch. No obstruction noted on RPG therefore stent not replaced.   - Recommended for imaging/functional study to assess kidney after ureteral rest with stent out x 1 week (lasix MAG3 renal scan and CT urogram). If obstruction noted, ideally percutaneous nephrostomy would be used for drainage to allow continued ureteral rest in preparation for possible pyeloplasty.    - Emergent/urgent pyeloplasty is not indicated. This is an outpatient procedure. Further imaging/functional eval necessary prior after ureteral rest.    - Toradal, Levsin for pain control.   - Pyridium for dysuria. UCx negative.     Okay to discharge home from  standpoint.

## 2022-07-04 NOTE — NURSING
NP informed that pt reported a small amount of blood in urine. Nurse did not see it and pt did dumb urine.

## 2022-07-04 NOTE — DISCHARGE SUMMARY
"[facility] Medicine  Discharge Summary      Patient Name: Pema Norris  MRN: [id number]  Patient Class: IP- Inpatient  Admission Date: 6/30/2022  Hospital Length of Stay: 3 days  Discharge Date and Time: 7/4/2022  1:58 PM  Attending Physician: No att. providers found   Discharging Provider: Annel Guerra MD  Primary Care Provider: Servando Blackburn MD      HPI:   From H&P by Jorge Mendieta NP:  "The patient is a 36 y.o. female who presents with complaint of left flank pain onset last night. The patient reports going to [facility] yesterday for her pain, which she describes as severe and "worse than having babies".  Her  notes she had a CT scan of her abdomen and was told she needed surgery to reconnect her ureter to her kidney. She describes being told there was no urologist available at that facility to perform the procedure. The patient was then intended to transfer to [facility] via EMS, but opted not to as her  was unable to ride with her. She reports having a fever of 101.3°F last night, which has since resolved. She describes currently feeling nauseas and light-headed. Of note, the patient has had two ureteral stents placed in her left kidney. She states her most recent stent was placed in February. The patient's urologist is Dr. Osorio.  The patient will be admitted for further management of her left sided hydronephrosis and Urology consult."    Review of transfer center note from the canceled transfer includes CT reading which states there was severe left sided hydronephrosis with edematous changes around the left kidney and double J ureteral stent.      Procedure(s) (LRB):  CYSTOURETEROSCOPY, WITH RETROGRADE PYELOGRAM (Left)      Hospital Course:   Patient with a congenital UPJ obstruction was admitted for urology consultation.  She was continued on IV ceftriaxone empirically for pyelonephritis while awaiting cultures.  She was seen by urology and " "taken for cystoscopy on 7/1 with left retrograde pyelogram, stone removal and left ureteral JJ stent removal.  Alligator graspers were inserted via the cystoscope and used to fragment stones/incrustations seen along the ureteral stent's external coil, and then the stent was removed.  Stone fragments were sent for analysis.  Patient was returned to her room and her diet was advanced.  IV antibiotics were continued while awaiting cultures.  She was afebrile while she was here and had a normal WBC.  Culture from 6/30 showed no growth, antibiotics discontinued.  She had severe pain overnight after the procedure and remained in the hospital for pain control.  She had 48 hours of toradol every 6 hours around the clock as well as pyridium and IV Dilaudid. On discharge she said she still had pain and had soreness all over "like bruises" although appeared to be sleeping well and tolerated her diet.  She was given a one week prescription of Percocet and the pyridium on discharge and will need to follow up in one week for further imaging as below.    Follow up was arranged through the urology service here, although patient may prefer to follow up with her urologist in [place].  After she has had one week period of ureteral rest with the stent out there should be an imaging/functional study to assess the kidney (Lasix MAG3 renal scan and CT urogram).  If obstruction is noted, ideally a percutaneous nephrostomy would be used for drainage to allow continued ureteral rest in preparation for possible pyeloplasty.  Patient would like this to be done emergently but it is considered an outpatient procedure.  She would prefer to have it done in [place] so she does not have to return to [place] but it was arranged to be done here in case she changes her mind.       Goals of Care Treatment Preferences:  Code Status: Full Code      Consults:   Consults (From admission, onward)        Status Ordering Provider      Completed " JAYDEN PEREZ     Inpatient consult to Urology  Once        Provider:  Bhanu Welch MD    Completed JAYDNE PEREZ            Final Active Diagnoses:    Diagnosis Date Noted POA    PRINCIPAL PROBLEM:  Acute left flank pain [R10.9] 06/30/2022 Yes    Pain due to ureteral stent [T83.84XA] 07/04/2022 Yes    Calculus of ureter [N20.1] 07/04/2022 Yes    Hydronephrosis with ureteropelvic junction (UPJ) obstruction [Q62.11]  Yes    Morbid obesity with BMI of 45.0-49.9, adult [E66.01, Z68.42] 07/01/2022 Not Applicable    UPJ obstruction, congenital [Q62.39] 06/30/2022 Not Applicable      Problems Resolved During this Admission:       Discharged Condition: stable    Disposition: Home or Self Care    Follow Up:   Follow-up Information     Servando Blackburn MD Follow up.    Specialty: Family Medicine  Why: Follow up in 1-2 weeks  Contact information:  [address]  [facility] [id number] [phone]             Bhanu Welch MD Follow up.    Specialty: Urology  Why: Follow up in one week for CT urogram and Lasix renal scan.  Clinic will arrange appointment with you.  Contact information:  [address] [place] [address]  [address]  [facility] [id number] [phone]             Mikey Osorio MD Follow up.    Specialty: Urology  Why: Follow up for the next available appointment  Contact information:  [address] [facility] [address] [facility] [address]  [facility] [id number] [phone]                       Patient Instructions:      NM Renal Scan with LASIX   Standing Status: Future Standing Exp. Date: 07/01/23   Order Comments: Please perform while gonsales is inserted into the patient's bladder.     Order Specific Question Answer Comments   May the Radiologist modify the order per protocol to meet the clinical needs of the patient? Yes      CT Urogram Abd Pelvis W WO   Standing Status: Future Standing Exp. Date: 07/01/23     Order Specific Question Answer Comments   Is the patient allergic to iodine or contrast? No     Is the patient on ANY Metformin drug such as Glucophage/Glucovance?           Should be off drug 48 hours after contrast. Check renal function before restart. No    History of Kidney Disease - including: decreased kidney function, dialysis, kidney transplay, single kidney, kidney cancer, kidney surgery? None    Does the patient have high blood pressure requiring medical treatment? No    Diabetes? No    May the Radiologist modify the order per protocol to meet the clinical needs of the patient? Yes      Diet Adult Regular     Activity as tolerated         Medications:  Reconciled Home Medications:      Medication List      START taking these medications    oxyCODONE-acetaminophen 7.5-325 mg per tablet  Commonly known as: PERCOCET  Take 1 tablet by mouth every 6 (six) hours as needed for Pain.     phenazopyridine 200 MG tablet  Commonly known as: PYRIDIUM  Take 1 tablet (200 mg total) by mouth 3 (three) times daily with meals. for 10 days        CONTINUE taking these medications    meloxicam 15 MG tablet  Commonly known as: MOBIC            Time spent on the discharge of patient: >30 minutes         Annel Guerra MD  Department of Hospital Medicine  [facility], [place], [facility] ([place])

## 2022-07-05 NOTE — TELEPHONE ENCOUNTER
Was discharged from [facility] today and was discharged. Pharmacy [facility] does not have the medication. Also has swelling in one leg but was that way in the hospital.Caller requesting pain medication Rx be changed to a local pharmacy.pt notified that MD will not be able to handle a narcotic order tonight. Would have to be done during office hours in the morning. Triage done and pt advised to ED.   Caller hung up before triage nurse completed the call and all directions.    Reason for Disposition   Sounds like a serious complication to the triager    Additional Information   Negative: Sounds like a life-threatening emergency to the triager   Negative: Chest pain   Negative: Difficulty breathing   Negative: Acting confused (e.g., disoriented, slurred speech) or excessively sleepy   Negative: Surgical incision symptoms and questions   Negative: [1] Discomfort (pain, burning or stinging) when passing urine AND [2] male   Negative: [1] Discomfort (pain, burning or stinging) when passing urine AND [2] female   Negative: Constipation   Negative: New or worsening leg (calf, thigh) pain   Negative: New or worsening leg swelling   Negative: Dizziness is severe, or persists > 24 hours after surgery   Negative: Pain, redness, swelling, or pus at IV Site   Negative: Symptoms arising from use of a urinary catheter (Tripp or Coude)   Negative: Cast problems or questions   Negative: Medication question   Negative: [1] Widespread rash AND [2] bright red, sunburn-like   Negative: [1] SEVERE headache AND [2] after spinal (epidural) anesthesia   Negative: [1] Vomiting AND [2] persists > 4 hours   Negative: [1] Vomiting AND [2] abdomen looks much more swollen than usual   Negative: [1] Drinking very little AND [2] dehydration suspected (e.g., no urine > 12 hours, very dry mouth, very lightheaded)   Negative: Patient sounds very sick or weak to the triager    Protocols used: POST-OP SYMPTOMS AND  [id number]

## 2022-07-06 VITALS
DIASTOLIC BLOOD PRESSURE: 91 MMHG | OXYGEN SATURATION: 98 % | WEIGHT: 293 LBS | TEMPERATURE: 98 F | RESPIRATION RATE: 16 BRPM | BODY MASS INDEX: 44.41 KG/M2 | HEIGHT: 68 IN | SYSTOLIC BLOOD PRESSURE: 144 MMHG | HEART RATE: 85 BPM

## 2022-07-10 ENCOUNTER — OUTSIDE PLACE OF SERVICE (OUTPATIENT)
Dept: UROLOGY | Facility: CLINIC | Age: 37
End: 2022-07-10

## 2022-07-11 LAB
COMPN STONE: NORMAL
SPECIMEN SOURCE: NORMAL
STONE ANALYSIS IR-IMP: NORMAL

## 2022-07-12 ENCOUNTER — OUTSIDE PLACE OF SERVICE (OUTPATIENT)
Dept: UROLOGY | Facility: CLINIC | Age: 37
End: 2022-07-12

## 2022-07-12 PROCEDURE — 99223 1ST HOSP IP/OBS HIGH 75: CPT | Mod: ,,, | Performed by: UROLOGY

## 2022-07-12 PROCEDURE — 99223 PR INITIAL HOSPITAL CARE,LEVL III: ICD-10-PCS | Mod: ,,, | Performed by: UROLOGY

## 2024-01-01 NOTE — HPI
"From H&P by Jorge Mendieta NP:  "The patient is a 36 y.o. female who presents with complaint of left flank pain onset last night. The patient reports going to [facility] yesterday for her pain, which she describes as severe and "worse than having babies".  Her  notes she had a CT scan of her abdomen and was told she needed surgery to reconnect her ureter to her kidney. She describes being told there was no urologist available at that facility to perform the procedure. The patient was then intended to transfer to [facility] via EMS, but opted not to as her  was unable to ride with her. She reports having a fever of 101.3°F last night, which has since resolved. She describes currently feeling nauseas and light-headed. Of note, the patient has had two ureteral stents placed in her left kidney. She states her most recent stent was placed in February. The patient's urologist is Dr. Osorio.  The patient will be admitted for further management of her left sided hydronephrosis and Urology consult."    Review of transfer center note from the canceled transfer includes CT reading which states there was severe left sided hydronephrosis with edematous changes around the left kidney and double J ureteral stent.  "
Pema Norris is a 36 y.o. female who presents from [facility] for severe left flank pain and fevers/chills with known history of left UPJ obstructions, left ureteral stent in place, last exchanged in February of 2022. She is an established urology patient of Dr. Osorio. He diagnosed her with left UPJ obstruction with crossing vessels back in August of 2021 with Mag3 scan showing delayed left nephrogram with functionality of 62% and 37% of the right and left kidney, respectively. A recommendation was made to have a possible left pyeloplasty, but as of now, no surgeries have been conducted or are planned. At bedside, she is AFVSS with pain well controlled. Cr 0.8 (baseline), WBC 5.2, hgb 10.4. UA with 3+OB, 3+ leuks, and nitrite positive. Micro-UA with 75RBC, 50WBC and many bacteria. Ucx pending. She remains on CTX.  
Was done for at least one hour

## (undated) DEVICE — SOL PVP-I SCRUB 7.5% 4OZ

## (undated) DEVICE — CATH URTRL OPEN END STR TIP 5F

## (undated) DEVICE — SET IRR URLGY 2LINE UNIV SPIKE

## (undated) DEVICE — Device

## (undated) DEVICE — GLOVE BIOGEL SKINSENSE PI 7.0

## (undated) DEVICE — SPONGE COTTON TRAY 4X4IN

## (undated) DEVICE — SCRUB 10% POVIDONE IODINE 4OZ

## (undated) DEVICE — TRAY DRY SKIN SCRUB PREP

## (undated) DEVICE — DRESSING TRANS 2X2 TEGADERM

## (undated) DEVICE — SEE L#120831

## (undated) DEVICE — BRUSH SCRUB SURGICALW/BETADINE